# Patient Record
Sex: FEMALE | Race: OTHER | HISPANIC OR LATINO | URBAN - METROPOLITAN AREA
[De-identification: names, ages, dates, MRNs, and addresses within clinical notes are randomized per-mention and may not be internally consistent; named-entity substitution may affect disease eponyms.]

---

## 2024-07-13 ENCOUNTER — EMERGENCY (EMERGENCY)
Facility: HOSPITAL | Age: 69
LOS: 0 days | Discharge: HOME | End: 2024-07-13
Attending: EMERGENCY MEDICINE
Payer: SELF-PAY

## 2024-07-13 VITALS
HEART RATE: 58 BPM | RESPIRATION RATE: 17 BRPM | DIASTOLIC BLOOD PRESSURE: 83 MMHG | SYSTOLIC BLOOD PRESSURE: 118 MMHG | OXYGEN SATURATION: 100 %

## 2024-07-13 VITALS — HEIGHT: 66 IN | WEIGHT: 178.79 LBS

## 2024-07-13 DIAGNOSIS — Z88.0 ALLERGY STATUS TO PENICILLIN: ICD-10-CM

## 2024-07-13 DIAGNOSIS — M25.552 PAIN IN LEFT HIP: ICD-10-CM

## 2024-07-13 DIAGNOSIS — M54.9 DORSALGIA, UNSPECIFIED: ICD-10-CM

## 2024-07-13 PROCEDURE — 96372 THER/PROPH/DIAG INJ SC/IM: CPT

## 2024-07-13 PROCEDURE — 99283 EMERGENCY DEPT VISIT LOW MDM: CPT | Mod: 25

## 2024-07-13 PROCEDURE — 99284 EMERGENCY DEPT VISIT MOD MDM: CPT

## 2024-07-13 PROCEDURE — 73503 X-RAY EXAM HIP UNI 4/> VIEWS: CPT | Mod: LT

## 2024-07-13 PROCEDURE — 73503 X-RAY EXAM HIP UNI 4/> VIEWS: CPT | Mod: 26,LT

## 2024-07-13 RX ORDER — CYCLOBENZAPRINE HYDROCHLORIDE 15 MG/1
10 CAPSULE, EXTENDED RELEASE ORAL ONCE
Refills: 0 | Status: COMPLETED | OUTPATIENT
Start: 2024-07-13 | End: 2024-07-13

## 2024-07-13 RX ORDER — ACETAMINOPHEN 500 MG/5ML
1000 LIQUID (ML) ORAL ONCE
Refills: 0 | Status: COMPLETED | OUTPATIENT
Start: 2024-07-13 | End: 2024-07-13

## 2024-07-13 RX ORDER — CYCLOBENZAPRINE HYDROCHLORIDE 15 MG/1
1 CAPSULE, EXTENDED RELEASE ORAL
Qty: 15 | Refills: 0
Start: 2024-07-13 | End: 2024-07-17

## 2024-07-13 RX ORDER — KETOROLAC TROMETHAMINE 30 MG/ML
30 INJECTION, SOLUTION INTRAMUSCULAR; INTRAVENOUS ONCE
Refills: 0 | Status: DISCONTINUED | OUTPATIENT
Start: 2024-07-13 | End: 2024-07-13

## 2024-07-13 RX ORDER — LIDOCAINE HYDROCHLORIDE 20 MG/ML
1 JELLY TOPICAL ONCE
Refills: 0 | Status: COMPLETED | OUTPATIENT
Start: 2024-07-13 | End: 2024-07-13

## 2024-07-13 RX ORDER — LIDOCAINE HYDROCHLORIDE 20 MG/ML
1 JELLY TOPICAL
Qty: 2 | Refills: 0
Start: 2024-07-13 | End: 2024-07-22

## 2024-07-13 RX ADMIN — Medication 1000 MILLIGRAM(S): at 13:39

## 2024-07-13 RX ADMIN — CYCLOBENZAPRINE HYDROCHLORIDE 10 MILLIGRAM(S): 15 CAPSULE, EXTENDED RELEASE ORAL at 13:39

## 2024-07-13 RX ADMIN — KETOROLAC TROMETHAMINE 30 MILLIGRAM(S): 30 INJECTION, SOLUTION INTRAMUSCULAR; INTRAVENOUS at 13:40

## 2024-07-13 RX ADMIN — LIDOCAINE HYDROCHLORIDE 1 PATCH: 20 JELLY TOPICAL at 13:39

## 2024-08-22 DIAGNOSIS — M25.559 PAIN IN UNSPECIFIED HIP: ICD-10-CM

## 2024-08-22 PROBLEM — Z00.00 ENCOUNTER FOR PREVENTIVE HEALTH EXAMINATION: Status: ACTIVE | Noted: 2024-08-22

## 2024-08-26 ENCOUNTER — APPOINTMENT (OUTPATIENT)
Dept: ORTHOPEDIC SURGERY | Facility: CLINIC | Age: 69
End: 2024-08-26
Payer: MEDICAID

## 2024-08-26 ENCOUNTER — NON-APPOINTMENT (OUTPATIENT)
Age: 69
End: 2024-08-26

## 2024-08-26 DIAGNOSIS — Z96.649 PRESENCE OF UNSPECIFIED ARTIFICIAL HIP JOINT: ICD-10-CM

## 2024-08-26 DIAGNOSIS — M25.552 PAIN IN LEFT HIP: ICD-10-CM

## 2024-08-26 PROCEDURE — 99204 OFFICE O/P NEW MOD 45 MIN: CPT

## 2024-08-26 NOTE — DISCUSSION/SUMMARY
[de-identified] : Left hip pain, history of cemented monoblock Ernesto hip arthroplasty in [place], Possible lumbar radiculopathy  Extensive discussion of the natural history of this issue was had with the patient.  We discussed the treatment options focusing on conservative therapy which includes anti-inflammatories, physical therapy/home exercise, & activity modification.   To ensure there is no infection I would like ESR and CRP blood work. An MRI of the left hip was ordered to evaluate for sources of pain and to see the extent of the cartilage We did discuss possible conversion total hip arthroplasty surgery.  I explained that this would require removal of the entire implant and cement and she would be at high risk of fracture and other complications.  Discussed this is a major surgery that would not shorten her leg length and may even lengthen it.  The goal would be for pain control however this is not guaranteed either. Patient will follow-up after the MRI and blood work are complete.  The patient's current medication management of their orthopedic diagnosis was reviewed today: (1) We discussed a comprehensive treatment plan that included possible pharmaceutical management involving the use of prescription strength medications including but not limited to options such as oral Ibuprofen 400mg QID, once daily Meloxicam 15 mg, or 500-650 mg Tylenol versus over the counter oral medications and topical prescription NSAID Pennsaid vs over the counter Voltaren gel. (2) There is a moderate risk of morbidity with further treatment, especially from use of prescription strength medications and possible side effects of these medications which include upset stomach with oral medications, skin reactions to topical medications and cardiac/renal issues with long term use. (3) I recommended that the patient follow-up with their medical physician to discuss any significant specific potential issues with long term medication use such as interactions with current medications or with exacerbation of underlying medical comorbidities. (4) The benefits and risks associated with use of injectable, oral or topical, prescription and over the counter anti-inflammatory medications were discussed with the patient. The patient voiced understanding of the risks including but not limited to bleeding, stroke, kidney dysfunction, heart disease, and were referred to the black box warning label for further information.

## 2024-08-26 NOTE — PHYSICAL EXAM
[de-identified] : Left hip Significant pain with deep flexion internal rotation Mildly tender to palpation and buttock Minimal pain with resisted abduction, adduction, hip flexion, knee flexion, good strength Negative straight leg raise Left leg is clinically 1 to 2 cm longer than right [de-identified] : Prior imaging reviewed-cemented monoblock left Ernesto hip arthroplasty, roughly 12 mm longer than right hip

## 2024-08-26 NOTE — HISTORY OF PRESENT ILLNESS
[de-identified] : 8/16/24: Patient presents with left hip pain.  History through .  Patient states she fell in [place] 2 years ago and underwent partial hip replacement.  Never felt great however the pain has been worsening in the left groin since then.  Also pain in the left buttock.  She now feels like it is hard to walk more than 1 block due to this pain.  She also states her left leg feels longer than her right.  Does have some occasional radiating pain as well as some numbness in her left foot at times.  Does feel like her leg alex.  Taking ibuprofen and lidocaine patches for the pain.  Did try physical therapy in the past which provided no relief.  Feels this hip pain is limiting her life.  Allergies: Penicillin-rash Hx of DVT/PE: Denies AC: Denies Tobacco: Denies PMH: Denies

## 2024-08-28 ENCOUNTER — NON-APPOINTMENT (OUTPATIENT)
Age: 69
End: 2024-08-28

## 2024-08-28 LAB
CRP SERPL-MCNC: <3 MG/L
ERYTHROCYTE [SEDIMENTATION RATE] IN BLOOD BY WESTERGREN METHOD: 15 MM/HR

## 2024-08-31 ENCOUNTER — OUTPATIENT (OUTPATIENT)
Dept: OUTPATIENT SERVICES | Facility: HOSPITAL | Age: 69
LOS: 1 days | End: 2024-08-31
Payer: MEDICAID

## 2024-08-31 ENCOUNTER — APPOINTMENT (OUTPATIENT)
Dept: MRI IMAGING | Facility: CLINIC | Age: 69
End: 2024-08-31

## 2024-08-31 DIAGNOSIS — Z96.649 PRESENCE OF UNSPECIFIED ARTIFICIAL HIP JOINT: ICD-10-CM

## 2024-08-31 DIAGNOSIS — M25.552 PAIN IN LEFT HIP: ICD-10-CM

## 2024-08-31 PROCEDURE — 73721 MRI JNT OF LWR EXTRE W/O DYE: CPT | Mod: 26,LT

## 2024-08-31 PROCEDURE — 73721 MRI JNT OF LWR EXTRE W/O DYE: CPT

## 2024-09-06 ENCOUNTER — OFFICE (OUTPATIENT)
Dept: URBAN - METROPOLITAN AREA CLINIC 70 | Facility: CLINIC | Age: 69
Setting detail: OPHTHALMOLOGY
End: 2024-09-06
Payer: MEDICAID

## 2024-09-06 DIAGNOSIS — H35.3131: ICD-10-CM

## 2024-09-06 DIAGNOSIS — H02.834: ICD-10-CM

## 2024-09-06 DIAGNOSIS — H25.13: ICD-10-CM

## 2024-09-06 DIAGNOSIS — H02.831: ICD-10-CM

## 2024-09-06 DIAGNOSIS — H52.4: ICD-10-CM

## 2024-09-06 PROCEDURE — 92004 COMPRE OPH EXAM NEW PT 1/>: CPT | Performed by: STUDENT IN AN ORGANIZED HEALTH CARE EDUCATION/TRAINING PROGRAM

## 2024-09-06 PROCEDURE — 92250 FUNDUS PHOTOGRAPHY W/I&R: CPT | Performed by: STUDENT IN AN ORGANIZED HEALTH CARE EDUCATION/TRAINING PROGRAM

## 2024-09-06 PROCEDURE — 92015 DETERMINE REFRACTIVE STATE: CPT | Performed by: STUDENT IN AN ORGANIZED HEALTH CARE EDUCATION/TRAINING PROGRAM

## 2024-09-06 ASSESSMENT — CONFRONTATIONAL VISUAL FIELD TEST (CVF)
OS_FINDINGS: FULL
OD_FINDINGS: FULL

## 2024-09-06 ASSESSMENT — LID POSITION - DERMATOCHALASIS
OS_DERMATOCHALASIS: 2+
OD_DERMATOCHALASIS: 2+

## 2024-09-13 ENCOUNTER — APPOINTMENT (OUTPATIENT)
Dept: ORTHOPEDIC SURGERY | Facility: CLINIC | Age: 69
End: 2024-09-13
Payer: MEDICAID

## 2024-09-13 ENCOUNTER — NON-APPOINTMENT (OUTPATIENT)
Age: 69
End: 2024-09-13

## 2024-09-13 DIAGNOSIS — M25.552 PAIN IN LEFT HIP: ICD-10-CM

## 2024-09-13 DIAGNOSIS — Z96.649 PRESENCE OF UNSPECIFIED ARTIFICIAL HIP JOINT: ICD-10-CM

## 2024-09-13 PROCEDURE — 99215 OFFICE O/P EST HI 40 MIN: CPT

## 2024-09-13 NOTE — DISCUSSION/SUMMARY
[de-identified] : Left hip arthritis status post Ernesto hip arthroplasty in [place] with limitations of activities of daily living and conservative treatment options failing to improve disability.  Plan: Left Hip conversion to total hip arthroplasty with removal of hardware  The patient has arthritis/end stage joint disease of the hip supported by x-ray or MRI that demonstrated one of the following:  periarticular osteophytes; joint space narrowing; avascular necrosis; subchondral cysts; subchondral sclerosis; or bone on bone articulation; or acetabular fracture; or femoral neck fracture; or nonunion, malunion, or failure of previous hip fracture surgery; or malignancy of the pelvis, proximal femur, or soft tissues of the hip.  One or more of the following conservative treatments have been tried and failed for 3 months or more: analgesic medication; home exercises; anti-inflammatory medication; physical therapy;   use of walker or cane;  We discussed the natural history of hip arthritis and the potential treatment options. The importance of diet and exercise was discussed as excess weight is a significant contributing factor. Due to the pain, failure of prior nonoperative treatment and associated disability, the patient is indicated for a total hip replacement.   A risk/benefit analysis was discussed with the patient reviewing the advantages and disadvantages of surgical intervention at this time. Both the level and length of the patient's pain have made additional conservative treatment measures contraindicated. A full explanation was given of the nature and the purpose of the procedure and anesthesia, its benefits, possible alternative methods of treatment, the risks involved, the possibility of complications, the foreseeable consequences of the procedure and the possible results of the non-treatment. I reviewed the plan of care as well as a model of a total hip implant equivalent to the one that will be used for their total hip replacement. The patient agrees with the plan of care as well as the use of implants for their replacement. We also discussed that if robotic/computer navigation is utilized, then additional incisions may need to be made to accommodate the computer navigation arrays.  The potential biologic and mechanical risks of the procedure were discussed. This discussion included but was not limited to thromboembolic disease, fracture, loss of fixation, infection, leg length discrepancy, dislocation and neurovascular injury. The patient is also understands that anesthesia and their comorbidities present additional risks. Proper expectations were addressed as this surgery may only partially or even fail to relieve their pain. The patient understands that additional surgery may be needed during the perioperative period or in the future. We discussed the durability of prosthetic hips and limitations related to wear, osteolysis and loosening. All questions were answered the patient's satisfaction. The patient was given my packet of additional information about the procedure.  Expect 1-2 day stay in hospital as this is less than standard across the country.  Although outpatient surgery may be feasible in carefully selected heathy patients - the definition of a "healthy" patient to do this in has not been properly studied in randomized trials.  This hospital time is important to observe for urinary retention, neurologic decline, cardiopulmonary issues, and signs of blood clot which are frequent early complications of joint replacements.  This time will also be used to work with PT so they are safe to navigate without falling which could lead to fracture and more surgery.   There is no evidence for any of the following contraindications for total joint replacement surgery:  active infection; active systemic bacteremia; active skin infection or open wound at surgical site; neuropathic arthritis; severe, rapidly progressive neurologic disease; severe medical conditions that makes the risks of surgery outweigh the potential benefit.  The hip/groin pain is affecting the ability to perform activities of daily living despite activity modifications.  The painful hip exam and the radiographic findings of cartilage loss are consistent with the hip OA as the source of the disability and pain. Patient has failed conservative treatment.  I explained that this would require removal of the entire implant and cement and she would be at high risk of fracture and other complications.  Discussed this is a major surgery that would not shorten her leg length and may even lengthen it.  The goal would be for pain control however this is not guaranteed either.  I emphasized the extremely high risk nature of the surgery, patient voiced understanding but feels she is unable to live as she is currently.  We discussed high risk of fractures, neurovascular injury due to prior scar tissue and the fact she was ready significantly lengthened.  I emphasized that she will likely continue to need a shoe lift on the other side.  Surgery will be scheduled at a convenient time.   Preop medical clearance.  CT scan for preop planning with Johnny Postop DVT ppx: Per Caprini Score Abx ppx: Ancef with extended Duricef to high risk infection in the revision setting Dressing: Negative pressure dressing to the elevated risk of infection in the revision setting.

## 2024-09-13 NOTE — PHYSICAL EXAM
[de-identified] : Left hip Significant pain with deep flexion internal rotation Mildly tender to palpation and buttock Minimal pain with resisted abduction, adduction, hip flexion, knee flexion, good strength Negative straight leg raise Left leg is clinically 1 to 2 cm longer than right [de-identified] : 9/13/24: MRI left hip 8/31/2024-no gross loosening, mild stress shielding proximal femur Prior imaging reviewed-cemented monoblock left Ernesto hip arthroplasty, roughly 12 mm longer than right hip

## 2024-09-13 NOTE — HISTORY OF PRESENT ILLNESS
[de-identified] : 9/13/24: Patient here for follow-up left hip pain.  History through nephew as per preference.  Patient states the pain is significantly limiting her life she would like to proceed with operative intervention.  8/16/24: Patient presents with left hip pain.  History through .  Patient states she fell in [place] 2 years ago and underwent partial hip replacement.  Never felt great however the pain has been worsening in the left groin since then.  Also pain in the left buttock.  She now feels like it is hard to walk more than 1 block due to this pain.  She also states her left leg feels longer than her right.  Does have some occasional radiating pain as well as some numbness in her left foot at times.  Does feel like her leg alex.  Taking ibuprofen and lidocaine patches for the pain.  Did try physical therapy in the past which provided no relief.  Feels this hip pain is limiting her life.  Allergies: Penicillin-rash Hx of DVT/PE: Denies AC: Denies Tobacco: Denies PMH: Denies

## 2024-09-14 ENCOUNTER — NON-APPOINTMENT (OUTPATIENT)
Age: 69
End: 2024-09-14

## 2024-09-18 ENCOUNTER — NON-APPOINTMENT (OUTPATIENT)
Age: 69
End: 2024-09-18

## 2024-09-23 ENCOUNTER — APPOINTMENT (OUTPATIENT)
Dept: CARDIOLOGY | Facility: CLINIC | Age: 69
End: 2024-09-23
Payer: MEDICAID

## 2024-09-23 ENCOUNTER — NON-APPOINTMENT (OUTPATIENT)
Age: 69
End: 2024-09-23

## 2024-09-23 VITALS
HEIGHT: 62 IN | HEART RATE: 75 BPM | SYSTOLIC BLOOD PRESSURE: 139 MMHG | TEMPERATURE: 98 F | OXYGEN SATURATION: 97 % | DIASTOLIC BLOOD PRESSURE: 73 MMHG | WEIGHT: 142 LBS | RESPIRATION RATE: 16 BRPM | BODY MASS INDEX: 26.13 KG/M2

## 2024-09-23 VITALS
DIASTOLIC BLOOD PRESSURE: 82 MMHG | SYSTOLIC BLOOD PRESSURE: 122 MMHG | HEART RATE: 59 BPM | BODY MASS INDEX: 33.31 KG/M2 | OXYGEN SATURATION: 95 % | WEIGHT: 181 LBS | TEMPERATURE: 98 F | HEIGHT: 62 IN

## 2024-09-23 DIAGNOSIS — R00.2 PALPITATIONS: ICD-10-CM

## 2024-09-23 DIAGNOSIS — Z78.9 OTHER SPECIFIED HEALTH STATUS: ICD-10-CM

## 2024-09-23 DIAGNOSIS — M25.559 PAIN IN UNSPECIFIED HIP: ICD-10-CM

## 2024-09-23 DIAGNOSIS — Z96.649 PRESENCE OF UNSPECIFIED ARTIFICIAL HIP JOINT: ICD-10-CM

## 2024-09-23 PROCEDURE — 99204 OFFICE O/P NEW MOD 45 MIN: CPT

## 2024-09-23 PROCEDURE — 93000 ELECTROCARDIOGRAM COMPLETE: CPT

## 2024-09-23 NOTE — PHYSICAL EXAM
[General Appearance - Well Developed] : well developed [Normal Appearance] : normal appearance [Well Groomed] : well groomed [General Appearance - Well Nourished] : well nourished [No Deformities] : no deformities [General Appearance - In No Acute Distress] : no acute distress [Normal Conjunctiva] : the conjunctiva exhibited no abnormalities [Eyelids - No Xanthelasma] : the eyelids demonstrated no xanthelasmas [Normal Oral Mucosa] : normal oral mucosa [No Oral Pallor] : no oral pallor [No Oral Cyanosis] : no oral cyanosis [Normal Jugular Venous A Waves Present] : normal jugular venous A waves present [Normal Jugular Venous V Waves Present] : normal jugular venous V waves present [No Jugular Venous Ospina A Waves] : no jugular venous ospina A waves [Respiration, Rhythm And Depth] : normal respiratory rhythm and effort [Exaggerated Use Of Accessory Muscles For Inspiration] : no accessory muscle use [Auscultation Breath Sounds / Voice Sounds] : lungs were clear to auscultation bilaterally [Heart Rate And Rhythm] : heart rate and rhythm were normal [Heart Sounds] : normal S1 and S2 [Murmurs] : no murmurs present [Abdomen Soft] : soft [Abdomen Tenderness] : non-tender [Abdomen Mass (___ Cm)] : no abdominal mass palpated [Abnormal Walk] : normal gait [Gait - Sufficient For Exercise Testing] : the gait was sufficient for exercise testing [Nail Clubbing] : no clubbing of the fingernails [Cyanosis, Localized] : no localized cyanosis [Petechial Hemorrhages (___cm)] : no petechial hemorrhages [Skin Color & Pigmentation] : normal skin color and pigmentation [] : no rash [No Venous Stasis] : no venous stasis [Skin Lesions] : no skin lesions [No Skin Ulcers] : no skin ulcer [No Xanthoma] : no  xanthoma was observed [Oriented To Time, Place, And Person] : oriented to person, place, and time [Affect] : the affect was normal [Mood] : the mood was normal [No Anxiety] : not feeling anxious

## 2024-09-23 NOTE — ASSESSMENT
[FreeTextEntry1] : Patient will have free of blood work done in the hospital.  EKG done in the office regular sinus rhythm poor R wave progression.  Needs evaluation after blood work.

## 2024-09-23 NOTE — HISTORY OF PRESENT ILLNESS
[Scheduled Procedure ___] : a [unfilled] [Date of Surgery ___] : on [unfilled] [FreeTextEntry1] : 68 years old female comes to the office f to establish as new patient for medical care. patient had left hip arthroplasty due to fracture about 2 years ago ...  Patient continues to have severe pain in the left hip since and patient needs revision of the left hip years.  No history of hypertension no history of diabetes mellitus-patient is not on medications.  Patient claims she is in good health

## 2024-09-29 ENCOUNTER — EMERGENCY (EMERGENCY)
Facility: HOSPITAL | Age: 69
LOS: 0 days | Discharge: HOME | End: 2024-09-29
Attending: STUDENT IN AN ORGANIZED HEALTH CARE EDUCATION/TRAINING PROGRAM
Payer: MEDICAID

## 2024-09-29 VITALS
OXYGEN SATURATION: 96 % | HEART RATE: 64 BPM | DIASTOLIC BLOOD PRESSURE: 81 MMHG | SYSTOLIC BLOOD PRESSURE: 129 MMHG | TEMPERATURE: 98 F | RESPIRATION RATE: 16 BRPM | WEIGHT: 180.78 LBS

## 2024-09-29 DIAGNOSIS — H66.91 OTITIS MEDIA, UNSPECIFIED, RIGHT EAR: ICD-10-CM

## 2024-09-29 DIAGNOSIS — H92.01 OTALGIA, RIGHT EAR: ICD-10-CM

## 2024-09-29 DIAGNOSIS — G44.89 OTHER HEADACHE SYNDROME: ICD-10-CM

## 2024-09-29 DIAGNOSIS — R51.9 HEADACHE, UNSPECIFIED: ICD-10-CM

## 2024-09-29 LAB
ALBUMIN SERPL ELPH-MCNC: 3.7 G/DL — SIGNIFICANT CHANGE UP (ref 3.3–5)
ALP SERPL-CCNC: 96 U/L — SIGNIFICANT CHANGE UP (ref 40–120)
ALT FLD-CCNC: 17 U/L — SIGNIFICANT CHANGE UP (ref 12–78)
ANION GAP SERPL CALC-SCNC: 6 MMOL/L — SIGNIFICANT CHANGE UP (ref 5–17)
AST SERPL-CCNC: 17 U/L — SIGNIFICANT CHANGE UP (ref 15–37)
BASOPHILS # BLD AUTO: 0.04 K/UL — SIGNIFICANT CHANGE UP (ref 0–0.2)
BASOPHILS NFR BLD AUTO: 0.6 % — SIGNIFICANT CHANGE UP (ref 0–2)
BILIRUB SERPL-MCNC: 0.5 MG/DL — SIGNIFICANT CHANGE UP (ref 0.2–1.2)
BUN SERPL-MCNC: 19 MG/DL — SIGNIFICANT CHANGE UP (ref 7–23)
CALCIUM SERPL-MCNC: 9 MG/DL — SIGNIFICANT CHANGE UP (ref 8.5–10.1)
CHLORIDE SERPL-SCNC: 106 MMOL/L — SIGNIFICANT CHANGE UP (ref 96–108)
CO2 SERPL-SCNC: 27 MMOL/L — SIGNIFICANT CHANGE UP (ref 22–31)
CREAT SERPL-MCNC: 0.82 MG/DL — SIGNIFICANT CHANGE UP (ref 0.5–1.3)
EGFR: 77 ML/MIN/1.73M2 — SIGNIFICANT CHANGE UP
EOSINOPHIL # BLD AUTO: 0.08 K/UL — SIGNIFICANT CHANGE UP (ref 0–0.5)
EOSINOPHIL NFR BLD AUTO: 1.3 % — SIGNIFICANT CHANGE UP (ref 0–6)
GLUCOSE SERPL-MCNC: 102 MG/DL — HIGH (ref 70–99)
HCT VFR BLD CALC: 45.7 % — HIGH (ref 34.5–45)
HGB BLD-MCNC: 15.1 G/DL — SIGNIFICANT CHANGE UP (ref 11.5–15.5)
IMM GRANULOCYTES NFR BLD AUTO: 0.3 % — SIGNIFICANT CHANGE UP (ref 0–0.9)
LYMPHOCYTES # BLD AUTO: 1.71 K/UL — SIGNIFICANT CHANGE UP (ref 1–3.3)
LYMPHOCYTES # BLD AUTO: 27.2 % — SIGNIFICANT CHANGE UP (ref 13–44)
MCHC RBC-ENTMCNC: 30.9 PG — SIGNIFICANT CHANGE UP (ref 27–34)
MCHC RBC-ENTMCNC: 33 GM/DL — SIGNIFICANT CHANGE UP (ref 32–36)
MCV RBC AUTO: 93.6 FL — SIGNIFICANT CHANGE UP (ref 80–100)
MONOCYTES # BLD AUTO: 0.36 K/UL — SIGNIFICANT CHANGE UP (ref 0–0.9)
MONOCYTES NFR BLD AUTO: 5.7 % — SIGNIFICANT CHANGE UP (ref 2–14)
NEUTROPHILS # BLD AUTO: 4.07 K/UL — SIGNIFICANT CHANGE UP (ref 1.8–7.4)
NEUTROPHILS NFR BLD AUTO: 64.9 % — SIGNIFICANT CHANGE UP (ref 43–77)
PLATELET # BLD AUTO: 300 K/UL — SIGNIFICANT CHANGE UP (ref 150–400)
POTASSIUM SERPL-MCNC: 4.5 MMOL/L — SIGNIFICANT CHANGE UP (ref 3.5–5.3)
POTASSIUM SERPL-SCNC: 4.5 MMOL/L — SIGNIFICANT CHANGE UP (ref 3.5–5.3)
PROT SERPL-MCNC: 8 GM/DL — SIGNIFICANT CHANGE UP (ref 6–8.3)
RBC # BLD: 4.88 M/UL — SIGNIFICANT CHANGE UP (ref 3.8–5.2)
RBC # FLD: 12.5 % — SIGNIFICANT CHANGE UP (ref 10.3–14.5)
SODIUM SERPL-SCNC: 139 MMOL/L — SIGNIFICANT CHANGE UP (ref 135–145)
WBC # BLD: 6.28 K/UL — SIGNIFICANT CHANGE UP (ref 3.8–10.5)
WBC # FLD AUTO: 6.28 K/UL — SIGNIFICANT CHANGE UP (ref 3.8–10.5)

## 2024-09-29 PROCEDURE — 96375 TX/PRO/DX INJ NEW DRUG ADDON: CPT

## 2024-09-29 PROCEDURE — 85025 COMPLETE CBC W/AUTO DIFF WBC: CPT

## 2024-09-29 PROCEDURE — 36415 COLL VENOUS BLD VENIPUNCTURE: CPT

## 2024-09-29 PROCEDURE — 80053 COMPREHEN METABOLIC PANEL: CPT

## 2024-09-29 PROCEDURE — 99284 EMERGENCY DEPT VISIT MOD MDM: CPT | Mod: 25

## 2024-09-29 PROCEDURE — 96374 THER/PROPH/DIAG INJ IV PUSH: CPT

## 2024-09-29 PROCEDURE — 70450 CT HEAD/BRAIN W/O DYE: CPT | Mod: 26,MC

## 2024-09-29 PROCEDURE — 99284 EMERGENCY DEPT VISIT MOD MDM: CPT

## 2024-09-29 PROCEDURE — 70450 CT HEAD/BRAIN W/O DYE: CPT | Mod: MC

## 2024-09-29 RX ORDER — ONDANSETRON 2 MG/ML
1 INJECTION, SOLUTION INTRAMUSCULAR; INTRAVENOUS
Qty: 12 | Refills: 0
Start: 2024-09-29 | End: 2024-10-02

## 2024-09-29 RX ORDER — SODIUM CHLORIDE 9 MG/ML
1000 INJECTION INTRAMUSCULAR; INTRAVENOUS; SUBCUTANEOUS ONCE
Refills: 0 | Status: COMPLETED | OUTPATIENT
Start: 2024-09-29 | End: 2024-09-29

## 2024-09-29 RX ORDER — ACETAMINOPHEN 325 MG/1
1000 TABLET ORAL ONCE
Refills: 0 | Status: COMPLETED | OUTPATIENT
Start: 2024-09-29 | End: 2024-09-29

## 2024-09-29 RX ORDER — METOCLOPRAMIDE HCL 5 MG
10 TABLET ORAL ONCE
Refills: 0 | Status: COMPLETED | OUTPATIENT
Start: 2024-09-29 | End: 2024-09-29

## 2024-09-29 RX ORDER — CEFPODOXIME PROXETIL 100 MG/5ML
200 GRANULE, FOR SUSPENSION ORAL ONCE
Refills: 0 | Status: COMPLETED | OUTPATIENT
Start: 2024-09-29 | End: 2024-09-29

## 2024-09-29 RX ORDER — CEFPODOXIME PROXETIL 100 MG/5ML
1 GRANULE, FOR SUSPENSION ORAL
Qty: 20 | Refills: 0
Start: 2024-09-29 | End: 2024-10-08

## 2024-09-29 RX ORDER — DIPHENHYDRAMINE HCL 50 MG
25 CAPSULE ORAL ONCE
Refills: 0 | Status: COMPLETED | OUTPATIENT
Start: 2024-09-29 | End: 2024-09-29

## 2024-09-29 RX ADMIN — Medication 25 MILLIGRAM(S): at 13:27

## 2024-09-29 RX ADMIN — CEFPODOXIME PROXETIL 200 MILLIGRAM(S): 100 GRANULE, FOR SUSPENSION ORAL at 15:28

## 2024-09-29 RX ADMIN — Medication 10 MILLIGRAM(S): at 13:31

## 2024-09-29 RX ADMIN — ACETAMINOPHEN 400 MILLIGRAM(S): 325 TABLET ORAL at 13:27

## 2024-09-29 RX ADMIN — SODIUM CHLORIDE 1000 MILLILITER(S): 9 INJECTION INTRAMUSCULAR; INTRAVENOUS; SUBCUTANEOUS at 13:31

## 2024-09-29 NOTE — ED STATDOCS - PATIENT PORTAL LINK FT
You can access the FollowMyHealth Patient Portal offered by [facility] by registering at the following website: [email or web address]. By joining [employer]’s FollowMyHealth portal, you will also be able to view your health information using other applications (apps) compatible with our system.

## 2024-09-29 NOTE — ED STATDOCS - CLINICAL SUMMARY MEDICAL DECISION MAKING FREE TEXT BOX
CT-scan showed fluid around mastoid, and will treat with antibiotics and ENT follow up. Headache improved with medications, Presentation was concerning for right acute otitis media.  In the setting of patient additionally having headache will provide pain control perform CT scan basic blood work monitor and reassessment.CT-scan showed fluid around mastoid, and will treat with antibiotics and dc home ENT follow up. Headache improved with medications,  patient is neurologically intact and afebrile. Given strict return precautions and DC home in stable condition  With oral antibiotics

## 2024-09-29 NOTE — ED STATDOCS - PHYSICAL EXAMINATION
Constitutional: well appearing, NAD AAOx3  Eyes: EOMI, PERRL  Head: Normocephalic atraumatic  Mouth: no airway obstruction, posterior oropharynx clear without erythema or exudate  Ears: bulging TM on the right side, no erythema, no effusion  Neck: supple  Cardiac: regular rate and rhythm, no MRG  Resp: Lungs CTAB  GI: Abd s/nt/nd  Neuro: CN2-12 intact, strength 5/5x4, sensation grossly intact  Skin: No rashes Constitutional: well appearing, NAD AAOx3  Eyes: EOMI, PERRL  Head: Normocephalic atraumatic  Mouth: no airway obstruction, posterior oropharynx clear without erythema or exudate  Ears: bulging TM on the right side, no erythema, no effusion. L TM grey without bulging, external auditory canals clear b/l. No tenderness over mastoid b/l  Neck: supple  Cardiac: regular rate and rhythm, no MRG  Resp: Lungs CTAB  GI: Abd s/nt/nd  Neuro: CN2-12 intact, strength 5/5x4, sensation grossly intact  Skin: No rashes

## 2024-09-29 NOTE — ED STATDOCS - PROGRESS NOTE DETAILS
67 y/o female with a PMHx of HA  presents to the ED c/o right ear pain and HA. Recently evaluated at urgent care, given migraine meds and discharged. Negative visual changes, nausea, vomiting, dizziness, and has never had imaging of her head in the past. Pt is alllergic to penicillin. P Pt. is a 68 year old female presents with HA and right ear pain.  Pt. evaluated at urgent care and DC with Excedron.  NEg. visual chages, vomting or dizziness.  Pt. has never been evluated for her headaches in the past.  Neg. dizziness.  Janel Hernández PA-C

## 2024-09-29 NOTE — ED STATDOCS - CARE PROVIDER_API CALL
Shahid Lizarraga  Neurology  [address], [address]  [place] [address]  Phone: [phone]  Fax: [phone]  Follow Up Time:     Presley Das  Otolaryngology  [address], [address]  [place] [address]  Phone: [phone]  Fax: [phone]  Follow Up Time:     Kirk He  Otolaryngology  [address] [place] [address]  Phone: [phone]  Fax: [phone]  Follow Up Time:

## 2024-09-29 NOTE — ED ADULT NURSE NOTE - OBJECTIVE STATEMENT
pt developed pain R. ear 1 week ago and headache. pt went to urgent care and told to take excedrin for migraines. pt had relief for 3 days, but returned on Thurday. last night pt had worsening headache and ear pain as well as nausea. pt vomited 2x yesterday.

## 2024-09-29 NOTE — ED STATDOCS - PROVIDER TOKENS
PROVIDER:[TOKEN:[[id number]]],PROVIDER:[TOKEN:[[id number]]],PROVIDER:[TOKEN:[[id number]]]

## 2024-09-29 NOTE — ED ADULT TRIAGE NOTE - CHIEF COMPLAINT QUOTE
pt presenting with sharp left ear pain and N/V. Pt states she has migraines, and went to UC a few days ago, they didn't give her ABX and said it was her migraine. Denies other symptoms

## 2024-09-29 NOTE — ED STATDOCS - OBJECTIVE STATEMENT
69 y/o female with a PMHx of HA  presents to the ED c/o right ear pain and HA. Recently evaluated at urgent care, given migraine meds and discharged. Negative visual changes, nausea, vomiting, dizziness, and has never had imaging of her head in the past. Pt is alllergic to penicillin. 69 y/o female with a PMHx of HA  presents to the ED c/o right ear pain and HA x2d. Recently evaluated at urgent care, given migraine meds and discharged. Negative visual changes, nausea, vomiting, dizziness, numbness tingling weakness, fever, chills, cp, sob and has never had imaging of her head in the past. Pt is alllergic to penicillin.

## 2024-09-29 NOTE — ED STATDOCS - NSFOLLOWUPINSTRUCTIONS_ED_ALL_ED_FT
Otitis Media, Adult  An ear, with close-ups of a normal ear and an ear filled with fluid.  Otitis media occurs when there is inflammation and fluid in the middle ear with signs and symptoms of an acute infection. The middle ear is a part of the ear that contains bones for hearing as well as air that helps send sounds to the brain. When infected fluid builds up in this space, it causes pressure and can lead to an ear infection. The eustachian tube connects the middle ear to the back of the nose (nasopharynx) and normally allows air into the middle ear. If the eustachian tube becomes blocked, fluid can build up and become infected.    What are the causes?  This condition is caused by a blockage in the eustachian tube. This can be caused by mucus or by swelling of the tube. Problems that can cause a blockage include:  A cold or other upper respiratory infection.  Allergies.  An irritant, such as tobacco smoke.  Enlarged adenoids. The adenoids are areas of soft tissue located high in the back of the throat, behind the nose and the roof of the mouth. They are part of the body's defense system (immune system).  A mass in the nasopharynx.  Damage to the ear caused by pressure changes (barotrauma).  What increases the risk?  You are more likely to develop this condition if you:  Smoke or are exposed to tobacco smoke.  Have an opening in the roof of your mouth (cleft palate).  Have gastroesophageal reflux.  Have an immune system disorder.  What are the signs or symptoms?  Symptoms of this condition include:  Ear pain.  Fever.  Decreased hearing.  Tiredness (lethargy).  Fluid leaking from the ear, if the eardrum is ruptured or has burst.  Ringing in the ear.  How is this diagnosed?  A health care provider checks a person's ear using an otoscope. A close-up of the ear and otoscope is also shown.  This condition is diagnosed with a physical exam. During the exam, your health care provider will use an instrument called an otoscope to look in your ear and check for redness, swelling, and fluid. He or she will also ask about your symptoms.    Your health care provider may also order tests, such as:  A pneumatic otoscopy. This is a test to check the movement of the eardrum. It is done by squeezing a small amount of air into the ear.  A tympanogram. This is a test that shows how well the eardrum moves in response to air pressure in the ear canal. It provides a graph for your health care provider to review.  How is this treated?  This condition can go away on its own within 3–5 days. But if the condition is caused by a bacterial infection and does not go away on its own, or if it keeps coming back, your health care provider may:  Prescribe antibiotic medicine to treat the infection.  Prescribe or recommend medicines to control pain.  Follow these instructions at home:  Take over-the-counter and prescription medicines only as told by your health care provider.  If you were prescribed an antibiotic medicine, take it as told by your health care provider. Do not stop taking the antibiotic even if you start to feel better.  Keep all follow-up visits. This is important.  Contact a health care provider if:  You have bleeding from your nose.  There is a lump on your neck.  You are not feeling better in 5 days.  You feel worse instead of better.  Get help right away if:  You have severe pain that is not controlled with medicine.  You have swelling, redness, or pain around your ear.  You have stiffness in your neck.  A part of your face is not moving (paralyzed).  The bone behind your ear (mastoid bone) is tender when you touch it.  You develop a severe headache.  Summary  Otitis media is redness, soreness, and swelling of the middle ear, usually resulting in pain and decreased hearing.  This condition can go away on its own within 3–5 days.  If the problem does not go away in 3–5 days, your health care provider may give you medicines to treat the infection.  If you were prescribed an antibiotic medicine, take it as told by your health care provider.  Follow all instructions that were given to you by your health care provider.  This information is not intended to replace advice given to you by your health care provider. Make sure you discuss any questions you have with your health care provider.      Acute Headache    WHAT YOU NEED TO KNOW:    An acute headache is pain or discomfort that may start suddenly and get worse quickly. You may have an acute headache only when you feel stress or eat certain foods. Other acute headache pain can happen every day, and sometimes several times a day.  Headache Types    DISCHARGE INSTRUCTIONS:    Seek care immediately if:    You have severe pain.    You have numbness or weakness on one side of your face or body.    You have a headache that occurs after a blow to the head, a fall, or other trauma.    You have a headache, are forgetful or confused, or have trouble speaking.    You have a headache, stiff neck, and a fever.  Call your doctor if:    You have a constant headache and are vomiting.    You have a headache each day that does not get better, even after treatment.    You have changes in your headaches, or new symptoms that occur when you have a headache.    You have questions or concerns about your condition or care.  Medicines: You may need any of the following:    Prescription pain medicine may be given. The medicine your healthcare provider recommends will depend on the kind of headaches you have. You will need to take prescription headache medicines as directed to prevent a problem called rebound headache. These headaches happen with regular use of pain relievers for headache disorders.    NSAIDs, such as ibuprofen, help decrease swelling, pain, and fever. This medicine is available with or without a doctor's order. NSAIDs can cause stomach bleeding or kidney problems in certain people. If you take blood thinner medicine, always ask your healthcare provider if NSAIDs are safe for you. Always read the medicine label and follow directions.    Acetaminophen decreases pain and fever. It is available without a doctor's order. Ask how much to take and how often to take it. Follow directions. Read the labels of all other medicines you are using to see if they also contain acetaminophen, or ask your doctor or pharmacist. Acetaminophen can cause liver damage if not taken correctly.    Antidepressants may be given for some kinds of headaches.    Take your medicine as directed. Contact your healthcare provider if you think your medicine is not helping or if you have side effects. Tell your provider if you are allergic to any medicine. Keep a list of the medicines, vitamins, and herbs you take. Include the amounts, and when and why you take them. Bring the list or the pill bottles to follow-up visits. Carry your medicine list with you in case of an emergency.  Manage your symptoms:    Apply heat or ice on the headache area. Use a heat or ice pack. For an ice pack, you can also put crushed ice in a plastic bag. Cover the pack or bag with a towel before you apply it to your skin. Ice and heat both help decrease pain, and heat also helps decrease muscle spasms. Apply heat for 20 to 30 minutes every 2 hours. Apply ice for 15 to 20 minutes every hour. Apply heat or ice for as long and for as many days as directed. You may alternate heat and ice.    Relax your muscles. Lie down in a comfortable position and close your eyes. Relax your muscles slowly. Start at your toes and work your way up your body.    Keep a record of your headaches. Write down when your headaches start and stop. Include your symptoms and what you were doing when the headache began. Record what you ate or drank for 24 hours before the headache started. Describe the pain and where it hurts. Keep track of what you did to treat your headache and if it worked.  Prevent an acute headache:    Avoid anything that triggers an acute headache. Examples include exposure to chemicals, going to high altitude, or not getting enough sleep. Create a regular sleep routine. Go to sleep at the same time and wake up at the same time each day. Do not use electronic devices before bedtime. These may trigger a headache or prevent you from sleeping well.    Do not smoke. Nicotine and other chemicals in cigarettes and cigars can trigger an acute headache or make it worse. Ask your healthcare provider for information if you currently smoke and need help to quit. E-cigarettes or smokeless tobacco still contain nicotine. Talk to your healthcare provider before you use these products.    Limit alcohol as directed. Alcohol can trigger an acute headache or make it worse. If you have cluster headaches, do not drink alcohol during an episode. For other types of headaches, ask your healthcare provider if it is safe for you to drink alcohol. Ask how much is safe for you to drink, and how often.    Exercise as directed. Exercise can reduce tension and help with headache pain. Aim for 30 minutes of physical activity on most days of the week. Your healthcare provider can help you create an exercise plan.    Eat a variety of healthy foods. Healthy foods include fruits, vegetables, low-fat dairy products, lean meats, fish, whole grains, and cooked beans. Your healthcare provider or dietitian can help you create meals plans if you need to avoid foods that trigger headaches.  Follow up with your healthcare provider as directed: Bring your headache record with you when you see your healthcare provider. Write down your questions so you remember to ask them during your visits.

## 2024-09-29 NOTE — ED STATDOCS - CARE PROVIDERS DIRECT ADDRESSES
,[email or web address]

## 2024-10-01 ENCOUNTER — NON-APPOINTMENT (OUTPATIENT)
Age: 69
End: 2024-10-01

## 2024-10-01 ENCOUNTER — APPOINTMENT (OUTPATIENT)
Dept: NEUROLOGY | Facility: CLINIC | Age: 69
End: 2024-10-01
Payer: MEDICAID

## 2024-10-01 VITALS
TEMPERATURE: 98.2 F | DIASTOLIC BLOOD PRESSURE: 72 MMHG | WEIGHT: 180 LBS | HEART RATE: 62 BPM | SYSTOLIC BLOOD PRESSURE: 106 MMHG | BODY MASS INDEX: 33.13 KG/M2 | HEIGHT: 62 IN

## 2024-10-01 DIAGNOSIS — G43.909 MIGRAINE, UNSPECIFIED, NOT INTRACTABLE, W/OUT STATUS MIGRAINOSUS: ICD-10-CM

## 2024-10-01 PROCEDURE — 99204 OFFICE O/P NEW MOD 45 MIN: CPT

## 2024-10-01 RX ORDER — CEFPODOXIME PROXETIL 200 MG/1
200 TABLET, FILM COATED ORAL TWICE DAILY
Refills: 0 | Status: ACTIVE | COMMUNITY

## 2024-10-01 NOTE — DATA REVIEWED
[de-identified] : CT head no contrast 9/292/4: No acute intracranial hemorrhage, mass effect, or midline shift.

## 2024-10-01 NOTE — HISTORY OF PRESENT ILLNESS
[FreeTextEntry1] : Ms. Costa presents today for neurology evaluation. [place], [place] [id number], assisted with visit (Malaysian).  She reports having headaches which began over one week ago.  She says that she often gets headaches during this season. The pain is over the front, top and back of her head. She also has pain on the sides of her head and over her face. She describes the pain as pressure. The pain comes and goes throughout the day.  Pain is rated 8-9/10. She has associated nausea but denies vision changes, photophobia or phonophobia.  She says that she was previously diagnosed with migraines previously and this feels similar.  She does not have auras. The frequency is sporadic and random.   She was given Excedrin in urgent care but this caused GI side effects.  She was seen in the ED on 9/29/24. She was prescribed Cefpodoxime for otitis media.  She was prescribed Flunarazine in [place]. This is a selective calcium antagonist. She was taking this daily and then the dose was weaned.

## 2024-10-01 NOTE — PHYSICAL EXAM
[FreeTextEntry1] : Examination: Constitutional: normal, no apparent distress Eyes: normal conjunctiva b/l, no ptosis, visual fields full Respiratory: no respiratory distress, normal effort, normal auscultation Cardiovascular: normal rate, rhythm, no murmurs Neck: supple, no masses Vascular: carotids normal Skin: normal color, no rashes Psych: normal mood, affect  Neurological: Memory: normal memory, oriented to person, place, time Language intact/no aphasia Cranial Nerves: II-XII intact, Pupils equally round and reactive to light, ocular muscles/movements intact, no ptosis, no facial weakness, tongue protrudes normally in the midline,  Motor: normal tone, no pronator drift, full strength in all four extremities in the proximal and distal muscle groups Coordination: Fine motor movements intact, rapid alternating movements intact, finger to nose intact bilaterally Sensory: intact to light touch, vibration, joint position sense, negative Romberg examination DTRs: symmetric, 2+ in b/l triceps, 2+ in b/l biceps, 2+ in b/l brachioradialis, 2+ in bilateral patellars, 1+ in bilateral Achilles, Babinskis negative bilaterally Gait: narrow based, steady

## 2024-10-01 NOTE — DISCUSSION/SUMMARY
[FreeTextEntry1] : Ms. Costa is a 68 year old woman with a history of migraines who presents with over one week of headache. She has a non-focal neurological exam. A recent head CT was unremarkable. She was prescribed antibiotics on 9/29/24, for otitis media.   We discussed different treatment options for headaches - daily preventative medications and abortive medications.  -She does not typically have such frequent headaches. -Suggest a trial of magnesium glycinate 400 mg/day or Migrelief (magnesium, riboflavin, feverfew) as a daily supplement. Advised that this may cause diarrhea. This may help decrease frequency. -Will also prescribe Nurtec ODT 75 mg. My hope is that effective treatment of her migraines will prevent having many days of headache.  It will likely take some time to get insurance approval. Samples will be given. I advised her not to take more than 1 tablet in a 24 hour period.  -Will obtain MRI brain.  Will arrange for f/u.

## 2024-10-03 RX ORDER — RIMEGEPANT SULFATE 75 MG/75MG
75 TABLET, ORALLY DISINTEGRATING ORAL
Qty: 8 | Refills: 2 | Status: ACTIVE | COMMUNITY
Start: 2024-10-01 | End: 1900-01-01

## 2024-10-07 ENCOUNTER — NON-APPOINTMENT (OUTPATIENT)
Age: 69
End: 2024-10-07

## 2024-10-09 ENCOUNTER — TRANSCRIPTION ENCOUNTER (OUTPATIENT)
Age: 69
End: 2024-10-09

## 2024-10-09 ENCOUNTER — OUTPATIENT (OUTPATIENT)
Dept: OUTPATIENT SERVICES | Facility: HOSPITAL | Age: 69
LOS: 1 days | End: 2024-10-09
Payer: MEDICAID

## 2024-10-09 ENCOUNTER — OUTPATIENT (OUTPATIENT)
Dept: OUTPATIENT SERVICES | Facility: HOSPITAL | Age: 69
LOS: 1 days | End: 2024-10-09

## 2024-10-09 ENCOUNTER — APPOINTMENT (OUTPATIENT)
Dept: CT IMAGING | Facility: CLINIC | Age: 69
End: 2024-10-09
Payer: MEDICAID

## 2024-10-09 VITALS
TEMPERATURE: 98 F | SYSTOLIC BLOOD PRESSURE: 128 MMHG | HEART RATE: 54 BPM | OXYGEN SATURATION: 98 % | DIASTOLIC BLOOD PRESSURE: 61 MMHG | HEIGHT: 63 IN | RESPIRATION RATE: 16 BRPM | WEIGHT: 180.78 LBS

## 2024-10-09 DIAGNOSIS — Z98.890 OTHER SPECIFIED POSTPROCEDURAL STATES: Chronic | ICD-10-CM

## 2024-10-09 DIAGNOSIS — Z29.9 ENCOUNTER FOR PROPHYLACTIC MEASURES, UNSPECIFIED: ICD-10-CM

## 2024-10-09 DIAGNOSIS — M25.552 PAIN IN LEFT HIP: ICD-10-CM

## 2024-10-09 DIAGNOSIS — Z96.642 PRESENCE OF LEFT ARTIFICIAL HIP JOINT: Chronic | ICD-10-CM

## 2024-10-09 DIAGNOSIS — Z00.8 ENCOUNTER FOR OTHER GENERAL EXAMINATION: ICD-10-CM

## 2024-10-09 DIAGNOSIS — Z01.818 ENCOUNTER FOR OTHER PREPROCEDURAL EXAMINATION: ICD-10-CM

## 2024-10-09 LAB
A1C WITH ESTIMATED AVERAGE GLUCOSE RESULT: 5.5 % — SIGNIFICANT CHANGE UP (ref 4–5.6)
APTT BLD: 31.6 SEC — SIGNIFICANT CHANGE UP (ref 24.5–35.6)
ESTIMATED AVERAGE GLUCOSE: 111 MG/DL — SIGNIFICANT CHANGE UP (ref 68–114)
INR BLD: 1.03 RATIO — SIGNIFICANT CHANGE UP (ref 0.85–1.16)
MRSA PCR RESULT.: SIGNIFICANT CHANGE UP
PROTHROM AB SERPL-ACNC: 12.1 SEC — SIGNIFICANT CHANGE UP (ref 9.9–13.4)
S AUREUS DNA NOSE QL NAA+PROBE: SIGNIFICANT CHANGE UP

## 2024-10-09 PROCEDURE — 85730 THROMBOPLASTIN TIME PARTIAL: CPT

## 2024-10-09 PROCEDURE — 85610 PROTHROMBIN TIME: CPT

## 2024-10-09 PROCEDURE — 87641 MR-STAPH DNA AMP PROBE: CPT

## 2024-10-09 PROCEDURE — 99214 OFFICE O/P EST MOD 30 MIN: CPT | Mod: 25

## 2024-10-09 PROCEDURE — 73700 CT LOWER EXTREMITY W/O DYE: CPT | Mod: 26,LT,MH

## 2024-10-09 PROCEDURE — 83036 HEMOGLOBIN GLYCOSYLATED A1C: CPT

## 2024-10-09 PROCEDURE — 36415 COLL VENOUS BLD VENIPUNCTURE: CPT

## 2024-10-09 PROCEDURE — 87640 STAPH A DNA AMP PROBE: CPT | Mod: 59

## 2024-10-09 NOTE — H&P PST ADULT - NSANTHTIREDRD_ENT_A_CORE
Mom states the child has had increased frequency with urination. Also, he has been having frequent accidents. He denies burning or fever. Mom states there is a family history of diabetes. Appointment made.  
No

## 2024-10-09 NOTE — H&P PST ADULT - CARDIOVASCULAR COMMENTS
preop evaluation done with ANDRÉS Poon for optimization for surgery Bilateral ankle edema, non pitting, right +2, left +1

## 2024-10-09 NOTE — H&P PST ADULT - PROBLEM SELECTOR PLAN 1
Total hip and total knee replacement:  Caprini score 9 or less: LOW risk  Caprini score 10 or highter: HIGH RISK

## 2024-10-09 NOTE — H&P PST ADULT - ASSESSMENT
68 y.o female scheduled for   Plan  1. Stop all NSAIDS, herbal supplements and vitamins for 7 days.  2. NPO at midnight.  3. Take the following medications --none---with small sips of water on the morning of your procedure/surgery.  4. Use EZ sponges as directed  5. Use mupirocin as directed  6. Labs, EKG, CXR, MRSA swab as per surgeon  7. PMD/cardiologist visit for optimization prior to surgery as per surgeon.  8. Joint class completed today  9. Patient adonis require a rolling walker at home due to their dx of arthritis to help complete the MRADL's   10. Preop education provided including Joint education booklet  68 y.o female scheduled for Left Hip Conversion to Total Hip Arthroplasty with Removal of Hardware   Plan  1. Stop all NSAIDS, herbal supplements and vitamins for 7 days.  2. NPO at midnight.  3. Take the following medications --none---with small sips of water on the morning of your procedure/surgery.  4. Use EZ sponges as directed  5. Use mupirocin as directed  6. Labs (CBC, CMP on chart, PT/PTT/INR, A1C done today, EKG - on chart, MRSA swab done today as per surgeon  7. PMD/cardiologist  ANDRÉS Poon visit for optimization prior to surgery as per surgeon.  8. Joint class to be  completed today  9. Patient will require a rolling walker at home due to their dx of arthritis to help complete the MRADL's   10. Preop education provided including Joint education booklet     CAPRINI SCORE Version 2013    AGE RELATED RISK FACTORS                                                             [ ] Age 41-60 years             [1 point]  [x ] Age: 61-74 years            [2 points]                 [ ] Age 75 years or over     [3 points]             DISEASE RELATED RISK FACTORS                                                       [ ] Current swollen legs (pitting edema of any level)     [1 point]                     [ ] Varicose veins (visible, bulging vein, not spider veins or surgically removed veins)   [1 point]                                 [x ] BMI > 25 Kg/m2                       [1 point]  [ ] BMI > 40 kg/m2                       [1 point]                                 [ ] Serious infection (within past month, requiring hospitalization and IV antibiotics)    [1 point]                     [ ] Lung disease ( interstitial: COPD, emphysema, sarcoid, 9-11 illness, NOT asthma)       [1 point]                                                                          [ ] Acute myocardial infarction (within past month)      [1 point]                  [ ] Congestive heart failure (episode within past month or taking CHF meds)                   [1 point]         [ ] Inflammatory bowel disease (Crohns disease or ulcerative colitis, not irritable bowel syndrome)   [1 point]                  [ ] Central venous access, PICC line or Port (within past month)     [2 points]                                                             [ ] Stroke (within past month)     [5 points]    [ ] Previous or present malignancy (includes melanoma but not basal cell carcinoma, each incidence of cancer scores 2 points-not metastases)  [2 points]                                                                                                                                                         HEMATOLOGY RELATED FACTORS                                                         [ ] History of DVT or PE (including superficial venous thrombosis)    [3 points]                    [ ] Positive family history for DVT or PE (includes first-, second-, and third-degree relatives)   [3 points]   [ ] Personal or family history of genetic thrombophilia (family history counts only if it has not been confirmed that patient does not have this genetic marker)                       [ ] Prothrombin [id number] mutation                   [3 points]                           [ ] Factor V Leiden                                               [3 points]            [ ] Antithrombin III deficiency                           [3 points]         [ ] Protein C & S deficiency                                [3 points]              [ ] Dysfibrinogenemia                                         [3 points]  [ ] Personal history of acquired thrombophilia                       [ ] Lupus anticoagulant                                       [3 points]                                                                  [ ] Anticardiolipin antibodies                             [3 points]              [ ] Antiphospholipid antibodies                         [3 points]                                                         [ ] High homocysteine in the blood                  [3 points]                                                    [ ] Myeloproliferative disorders (including thrombocytosis)    [3 points]            [ ] HIV                                                                     [3 points]                                                    [ ] Heparin induced thrombocytopenia            [3 points]                                        MOBILITY RELATED FACTORS  [ ] Bed rest or restricted mobility (inability to ambulate 30 feet continuously or removable leg brace) for less than 72 hours    [1 point]  [ ] Nonremovable plaster cast or mold that prevents calf muscle use   [2 points]  [ ] Bed bound  or restricted mobility for more than 72 hours                  [2 points]    GENDER SPECIFIC FACTORS  [ ] Pregnancy or had a baby within the last month   [1 point]  [ ] Hormone therapy  or oral contraception               [1 point]  [ ] Current use of estrogen-like drugs (raloxifine, tamoxifen, anastrozole, letrozole)                                [1 point]  [ ] History of unexplained stillborn infant, premature birth with toxemia or growth-restricted infant   [1 point]  [ ] Recurrent spontaneous abortions (3 or more)      [1 point]    OTHER RISK FACTORS                                         [ ] Current smoker (includes vaping and smoking marijuana)      [1 point]  [ ] Diabetes requiring insulin     [1 point]                   [ ] Chemotherapy (includes methotrexate for rheumatoid arthritis, hydroxyurea for thrombocytosis)    [1 point]  [ ] Blood transfusion(s)               [1 point]    SURGERY RELATED RISK FACTORS  [ ]  section within the last month                    [1 point]  [ ] Minor surgery is planned (less than 45 minutes)     [1 point]  [ ] Past major surgery (longer than 45 minutes) within past month  [2 points]  [ ] Planned major surgery lasting more than 45 minutes (includes laparoscopic and arthroscopic; do not add to the "5" for hip and knee replacement)    [2 points]  [ ] Length of surgery over 2 hours (includes anesthesia time; do not add to the "5" for hip and knee replacement)   [1 point]  [x ] Elective hip or knee joint replacement surgery         [5 points]                                               TRAUMA RELATED RISK FACTORS  [ ] Fracture of the hip, pelvis, or leg                       [5 points]  [ ] Spinal cord injury resulting in paralysis (within the past month)    [5 points]  [ ] Paralysis  (within the past month)                      [5 points]  [ ] Multiple trauma (within the past month)         [5 Points]    Total Score [     8   ]    Total hip and total knee replacement:  Caprini score 9 or less: LOW risk  Caprini score 10 or highter: HIGH RISK    Caprini score 0-2: Low Risk, NO VTE prophylaxis required for most patients, encourage ambulation  Caprini score 3-6: Moderate Risk , pharmacologic VTE prophylaxis is indicated for most patients (in the absence of contraindications)  Caprini score 7 or higher: High risk, pharmocologic VTE prophylaxis indicated for most patients (in the absence of contraindications)

## 2024-10-09 NOTE — H&P PST ADULT - PATIENT ON (OXYGEN DELIVERY METHOD)
room air [Mother] : mother [whole ___ oz/d] : consumes [unfilled] oz of whole cow's milk per day [Fruit] : fruit [Vegetables] : vegetables [Fish] : fish [___ voids per day] : [unfilled] voids per day [Normal] : Normal [In own bed] : In own bed [Brushing teeth] : Brushing teeth [In Pre-K] : In Pre-K [Appropiate parent-child communication] : Appropriate parent-child communication [Child given choices] : Child given choices [Parent has appropriate responses to behavior] : Parent has appropriate responses to behavior [No] : No cigarette smoke exposure [Car seat in back seat] : Car seat in back seat [Carbon Monoxide Detectors] : Carbon monoxide detectors [Supervised outdoor play] : Supervised outdoor play [Exposure to electronic nicotine delivery system] : No exposure to electronic nicotine delivery system [FreeTextEntry7] : dyarticualtion [FreeTextEntry9] : ST 4 x week

## 2024-10-09 NOTE — H&P PST ADULT - NSICDXPASTMEDICALHX_GEN_ALL_CORE_FT
PAST MEDICAL HISTORY:  H/O fracture of left hip     H/O headache     History of fracture of left ankle     Left hip pain

## 2024-10-09 NOTE — H&P PST ADULT - NSICDXPASTSURGICALHX_GEN_ALL_CORE_FT
PAST SURGICAL HISTORY:  H/O colonoscopy     History of ankle surgery     History of esophagogastroduodenoscopy (EGD)     History of hemiarthroplasty of left hip

## 2024-10-09 NOTE — H&P PST ADULT - HISTORY OF PRESENT ILLNESS
68 y.o WD, WN Tajik speaking female presents to PST with hx of left hip pain .  68 y.o WD, WN Danish speaking female presents to PST with hx of left hip pain . Patient with hx of a left hip fracture in October 2022. She had surgery at that time. She has been experiencing left hip pain, worse with ambulation. Followed with ortho, diagnostics indicating an issue with the prosthetic in place, requires replacement. She has discussed with ortho and scheduled for a Left Hip Conversion to Total Hip Arthroplasty with Removal of Hardware

## 2024-10-10 ENCOUNTER — APPOINTMENT (OUTPATIENT)
Dept: MRI IMAGING | Facility: CLINIC | Age: 69
End: 2024-10-10

## 2024-10-10 ENCOUNTER — OUTPATIENT (OUTPATIENT)
Dept: OUTPATIENT SERVICES | Facility: HOSPITAL | Age: 69
LOS: 1 days | End: 2024-10-10
Payer: MEDICAID

## 2024-10-10 DIAGNOSIS — Z01.818 ENCOUNTER FOR OTHER PREPROCEDURAL EXAMINATION: ICD-10-CM

## 2024-10-10 DIAGNOSIS — Z98.890 OTHER SPECIFIED POSTPROCEDURAL STATES: Chronic | ICD-10-CM

## 2024-10-10 DIAGNOSIS — G43.909 MIGRAINE, UNSPECIFIED, NOT INTRACTABLE, WITHOUT STATUS MIGRAINOSUS: ICD-10-CM

## 2024-10-10 DIAGNOSIS — Z96.642 PRESENCE OF LEFT ARTIFICIAL HIP JOINT: Chronic | ICD-10-CM

## 2024-10-10 PROCEDURE — 70551 MRI BRAIN STEM W/O DYE: CPT | Mod: 26

## 2024-10-11 PROBLEM — Z87.81 PERSONAL HISTORY OF (HEALED) TRAUMATIC FRACTURE: Chronic | Status: ACTIVE | Noted: 2024-10-09

## 2024-10-12 PROBLEM — Z87.898 PERSONAL HISTORY OF OTHER SPECIFIED CONDITIONS: Chronic | Status: ACTIVE | Noted: 2024-10-09

## 2024-10-12 PROBLEM — M25.552 PAIN IN LEFT HIP: Chronic | Status: ACTIVE | Noted: 2024-10-09

## 2024-10-14 RX ORDER — CELECOXIB 100 MG
200 CAPSULE ORAL EVERY 12 HOURS
Refills: 0 | Status: DISCONTINUED | OUTPATIENT
Start: 2024-10-21 | End: 2024-10-23

## 2024-10-14 RX ORDER — B-COMPLEX WITH VITAMIN C
1 VIAL (ML) INJECTION DAILY
Refills: 0 | Status: DISCONTINUED | OUTPATIENT
Start: 2024-10-21 | End: 2024-10-23

## 2024-10-14 RX ORDER — TRANEXAMIC ACID 650 MG/1
1000 TABLET ORAL ONCE
Refills: 0 | Status: DISCONTINUED | OUTPATIENT
Start: 2024-10-21 | End: 2024-10-23

## 2024-10-14 RX ORDER — ONDANSETRON HYDROCHLORIDE 2 MG/ML
4 INJECTION, SOLUTION INTRAMUSCULAR; INTRAVENOUS EVERY 6 HOURS
Refills: 0 | Status: DISCONTINUED | OUTPATIENT
Start: 2024-10-21 | End: 2024-10-23

## 2024-10-14 RX ORDER — HYDROMORPHONE HCL/0.9% NACL/PF 6 MG/30 ML
0.5 PATIENT CONTROLLED ANALGESIA SYRINGE INTRAVENOUS
Refills: 0 | Status: DISCONTINUED | OUTPATIENT
Start: 2024-10-21 | End: 2024-10-23

## 2024-10-14 RX ORDER — SENNA 187 MG
2 TABLET ORAL AT BEDTIME
Refills: 0 | Status: DISCONTINUED | OUTPATIENT
Start: 2024-10-21 | End: 2024-10-23

## 2024-10-14 RX ORDER — ACETAMINOPHEN 500 MG
1000 TABLET ORAL EVERY 8 HOURS
Refills: 0 | Status: DISCONTINUED | OUTPATIENT
Start: 2024-10-21 | End: 2024-10-23

## 2024-10-14 RX ORDER — DEXAMETHASONE 1.5 MG 1.5 MG/1
8 TABLET ORAL ONCE
Refills: 0 | Status: COMPLETED | OUTPATIENT
Start: 2024-10-22 | End: 2024-10-22

## 2024-10-14 RX ORDER — OXYCODONE HYDROCHLORIDE 30 MG/1
10 TABLET ORAL
Refills: 0 | Status: DISCONTINUED | OUTPATIENT
Start: 2024-10-21 | End: 2024-10-23

## 2024-10-14 RX ORDER — POLYETHYLENE GLYCOL 3350 17 G/17G
17 POWDER, FOR SOLUTION ORAL AT BEDTIME
Refills: 0 | Status: DISCONTINUED | OUTPATIENT
Start: 2024-10-21 | End: 2024-10-23

## 2024-10-14 RX ORDER — OXYCODONE HYDROCHLORIDE 30 MG/1
5 TABLET ORAL
Refills: 0 | Status: DISCONTINUED | OUTPATIENT
Start: 2024-10-21 | End: 2024-10-23

## 2024-10-14 RX ORDER — DIETHYLTOLUAMIDE 7 %
1 SPRAY, NON-AEROSOL (ML) TOPICAL THREE TIMES A DAY
Refills: 0 | Status: DISCONTINUED | OUTPATIENT
Start: 2024-10-21 | End: 2024-10-23

## 2024-10-14 RX ORDER — ASPIRIN/MAG CARB/ALUMINUM AMIN 325 MG
81 TABLET ORAL EVERY 12 HOURS
Refills: 0 | Status: DISCONTINUED | OUTPATIENT
Start: 2024-10-22 | End: 2024-10-23

## 2024-10-14 RX ORDER — PANTOPRAZOLE SODIUM 40 MG/1
40 TABLET, DELAYED RELEASE ORAL
Refills: 0 | Status: DISCONTINUED | OUTPATIENT
Start: 2024-10-21 | End: 2024-10-23

## 2024-10-14 RX ORDER — ACETAMINOPHEN 500 MG
1000 TABLET ORAL EVERY 8 HOURS
Refills: 0 | Status: COMPLETED | OUTPATIENT
Start: 2024-10-21 | End: 2024-10-22

## 2024-10-16 ENCOUNTER — NON-APPOINTMENT (OUTPATIENT)
Age: 69
End: 2024-10-16

## 2024-10-17 ENCOUNTER — APPOINTMENT (OUTPATIENT)
Dept: CT IMAGING | Facility: CLINIC | Age: 69
End: 2024-10-17

## 2024-10-17 ENCOUNTER — OUTPATIENT (OUTPATIENT)
Dept: OUTPATIENT SERVICES | Facility: HOSPITAL | Age: 69
LOS: 1 days | End: 2024-10-17
Payer: MEDICARE

## 2024-10-17 DIAGNOSIS — Z98.890 OTHER SPECIFIED POSTPROCEDURAL STATES: Chronic | ICD-10-CM

## 2024-10-17 DIAGNOSIS — Z96.649 PRESENCE OF UNSPECIFIED ARTIFICIAL HIP JOINT: ICD-10-CM

## 2024-10-17 DIAGNOSIS — Z96.642 PRESENCE OF LEFT ARTIFICIAL HIP JOINT: Chronic | ICD-10-CM

## 2024-10-17 PROCEDURE — 73700 CT LOWER EXTREMITY W/O DYE: CPT | Mod: 26,LT,MH

## 2024-10-18 ENCOUNTER — APPOINTMENT (OUTPATIENT)
Dept: CARDIOLOGY | Facility: CLINIC | Age: 69
End: 2024-10-18
Payer: MEDICARE

## 2024-10-18 VITALS
WEIGHT: 180 LBS | TEMPERATURE: 98 F | SYSTOLIC BLOOD PRESSURE: 114 MMHG | HEIGHT: 62 IN | HEART RATE: 77 BPM | OXYGEN SATURATION: 97 % | DIASTOLIC BLOOD PRESSURE: 78 MMHG | BODY MASS INDEX: 33.13 KG/M2

## 2024-10-18 DIAGNOSIS — G43.909 MIGRAINE, UNSPECIFIED, NOT INTRACTABLE, W/OUT STATUS MIGRAINOSUS: ICD-10-CM

## 2024-10-18 DIAGNOSIS — M25.559 PAIN IN UNSPECIFIED HIP: ICD-10-CM

## 2024-10-18 DIAGNOSIS — M25.552 PAIN IN LEFT HIP: ICD-10-CM

## 2024-10-18 DIAGNOSIS — R00.2 PALPITATIONS: ICD-10-CM

## 2024-10-18 DIAGNOSIS — Z96.649 PRESENCE OF UNSPECIFIED ARTIFICIAL HIP JOINT: ICD-10-CM

## 2024-10-18 PROCEDURE — 99215 OFFICE O/P EST HI 40 MIN: CPT

## 2024-10-21 ENCOUNTER — RESULT REVIEW (OUTPATIENT)
Age: 69
End: 2024-10-21

## 2024-10-21 ENCOUNTER — INPATIENT (INPATIENT)
Facility: HOSPITAL | Age: 69
LOS: 1 days | Discharge: HOME HEALTH | DRG: 468 | End: 2024-10-23
Attending: STUDENT IN AN ORGANIZED HEALTH CARE EDUCATION/TRAINING PROGRAM | Admitting: STUDENT IN AN ORGANIZED HEALTH CARE EDUCATION/TRAINING PROGRAM
Payer: MEDICAID

## 2024-10-21 ENCOUNTER — APPOINTMENT (OUTPATIENT)
Dept: ORTHOPEDIC SURGERY | Facility: HOSPITAL | Age: 69
End: 2024-10-21

## 2024-10-21 ENCOUNTER — NON-APPOINTMENT (OUTPATIENT)
Age: 69
End: 2024-10-21

## 2024-10-21 ENCOUNTER — TRANSCRIPTION ENCOUNTER (OUTPATIENT)
Age: 69
End: 2024-10-21

## 2024-10-21 VITALS
HEIGHT: 63 IN | SYSTOLIC BLOOD PRESSURE: 133 MMHG | HEART RATE: 65 BPM | WEIGHT: 180.78 LBS | DIASTOLIC BLOOD PRESSURE: 81 MMHG | OXYGEN SATURATION: 98 % | TEMPERATURE: 98 F | RESPIRATION RATE: 16 BRPM

## 2024-10-21 DIAGNOSIS — Z96.642 PRESENCE OF LEFT ARTIFICIAL HIP JOINT: Chronic | ICD-10-CM

## 2024-10-21 DIAGNOSIS — Z98.890 OTHER SPECIFIED POSTPROCEDURAL STATES: Chronic | ICD-10-CM

## 2024-10-21 DIAGNOSIS — Z96.649 PRESENCE OF UNSPECIFIED ARTIFICIAL HIP JOINT: ICD-10-CM

## 2024-10-21 DIAGNOSIS — M25.552 PAIN IN LEFT HIP: ICD-10-CM

## 2024-10-21 LAB
ANION GAP SERPL CALC-SCNC: 6 MMOL/L — SIGNIFICANT CHANGE UP (ref 5–17)
BUN SERPL-MCNC: 12 MG/DL — SIGNIFICANT CHANGE UP (ref 7–23)
CALCIUM SERPL-MCNC: 7.9 MG/DL — LOW (ref 8.5–10.1)
CHLORIDE SERPL-SCNC: 109 MMOL/L — HIGH (ref 96–108)
CO2 SERPL-SCNC: 23 MMOL/L — SIGNIFICANT CHANGE UP (ref 22–31)
CREAT SERPL-MCNC: 0.57 MG/DL — SIGNIFICANT CHANGE UP (ref 0.5–1.3)
EGFR: 98 ML/MIN/1.73M2 — SIGNIFICANT CHANGE UP
GLUCOSE BLDC GLUCOMTR-MCNC: 100 MG/DL — HIGH (ref 70–99)
GLUCOSE SERPL-MCNC: 144 MG/DL — HIGH (ref 70–99)
HCT VFR BLD CALC: 33.8 % — LOW (ref 34.5–45)
HGB BLD-MCNC: 11.2 G/DL — LOW (ref 11.5–15.5)
MCHC RBC-ENTMCNC: 31.5 PG — SIGNIFICANT CHANGE UP (ref 27–34)
MCHC RBC-ENTMCNC: 33.1 GM/DL — SIGNIFICANT CHANGE UP (ref 32–36)
MCV RBC AUTO: 94.9 FL — SIGNIFICANT CHANGE UP (ref 80–100)
PLATELET # BLD AUTO: 215 K/UL — SIGNIFICANT CHANGE UP (ref 150–400)
POTASSIUM SERPL-MCNC: 3.3 MMOL/L — LOW (ref 3.5–5.3)
POTASSIUM SERPL-SCNC: 3.3 MMOL/L — LOW (ref 3.5–5.3)
RBC # BLD: 3.56 M/UL — LOW (ref 3.8–5.2)
RBC # FLD: 13 % — SIGNIFICANT CHANGE UP (ref 10.3–14.5)
SODIUM SERPL-SCNC: 138 MMOL/L — SIGNIFICANT CHANGE UP (ref 135–145)
WBC # BLD: 12.78 K/UL — HIGH (ref 3.8–10.5)
WBC # FLD AUTO: 12.78 K/UL — HIGH (ref 3.8–10.5)

## 2024-10-21 PROCEDURE — 87075 CULTR BACTERIA EXCEPT BLOOD: CPT

## 2024-10-21 PROCEDURE — 87102 FUNGUS ISOLATION CULTURE: CPT

## 2024-10-21 PROCEDURE — 99222 1ST HOSP IP/OBS MODERATE 55: CPT

## 2024-10-21 PROCEDURE — 73501 X-RAY EXAM HIP UNI 1 VIEW: CPT | Mod: 26,LT

## 2024-10-21 PROCEDURE — C9399: CPT

## 2024-10-21 PROCEDURE — 20985 CPTR-ASST DIR MS PX: CPT

## 2024-10-21 PROCEDURE — 27132 TOTAL HIP ARTHROPLASTY: CPT | Mod: LT

## 2024-10-21 PROCEDURE — 73501 X-RAY EXAM HIP UNI 1 VIEW: CPT | Mod: LT

## 2024-10-21 PROCEDURE — 36415 COLL VENOUS BLD VENIPUNCTURE: CPT

## 2024-10-21 PROCEDURE — 87070 CULTURE OTHR SPECIMN AEROBIC: CPT

## 2024-10-21 PROCEDURE — 99254 IP/OBS CNSLTJ NEW/EST MOD 60: CPT

## 2024-10-21 PROCEDURE — 88300 SURGICAL PATH GROSS: CPT

## 2024-10-21 PROCEDURE — 85027 COMPLETE CBC AUTOMATED: CPT

## 2024-10-21 PROCEDURE — 80048 BASIC METABOLIC PNL TOTAL CA: CPT

## 2024-10-21 PROCEDURE — 82962 GLUCOSE BLOOD TEST: CPT

## 2024-10-21 PROCEDURE — 97607 NEG PRS WND THR NDME<=50SQCM: CPT

## 2024-10-21 PROCEDURE — 11981 INSERTION DRUG DLVR IMPLANT: CPT

## 2024-10-21 PROCEDURE — C1776: CPT

## 2024-10-21 PROCEDURE — 97530 THERAPEUTIC ACTIVITIES: CPT | Mod: GP

## 2024-10-21 PROCEDURE — 97166 OT EVAL MOD COMPLEX 45 MIN: CPT | Mod: GO

## 2024-10-21 PROCEDURE — C1713: CPT

## 2024-10-21 PROCEDURE — C1889: CPT

## 2024-10-21 PROCEDURE — 97116 GAIT TRAINING THERAPY: CPT | Mod: GP

## 2024-10-21 PROCEDURE — 88300 SURGICAL PATH GROSS: CPT | Mod: 26

## 2024-10-21 PROCEDURE — 97162 PT EVAL MOD COMPLEX 30 MIN: CPT | Mod: GP

## 2024-10-21 RX ORDER — APREPITANT 40 MG/1
40 CAPSULE ORAL ONCE
Refills: 0 | Status: COMPLETED | OUTPATIENT
Start: 2024-10-21 | End: 2024-10-21

## 2024-10-21 RX ORDER — FENTANYL CITRAT/DEXTROSE 5%/PF 1250MCG/50
50 PATIENT CONTROLLED ANALGESIA SYRINGE INTRAVENOUS
Refills: 0 | Status: DISCONTINUED | OUTPATIENT
Start: 2024-10-21 | End: 2024-10-21

## 2024-10-21 RX ORDER — OXYCODONE HYDROCHLORIDE 30 MG/1
1 TABLET ORAL
Qty: 20 | Refills: 0
Start: 2024-10-21 | End: 2024-10-25

## 2024-10-21 RX ORDER — OXYCODONE HYDROCHLORIDE 30 MG/1
5 TABLET ORAL ONCE
Refills: 0 | Status: DISCONTINUED | OUTPATIENT
Start: 2024-10-21 | End: 2024-10-21

## 2024-10-21 RX ORDER — CEFADROXIL 250 MG/5ML
1 POWDER, FOR SUSPENSION ORAL
Qty: 14 | Refills: 0
Start: 2024-10-21 | End: 2024-10-27

## 2024-10-21 RX ORDER — ONDANSETRON HYDROCHLORIDE 2 MG/ML
4 INJECTION, SOLUTION INTRAMUSCULAR; INTRAVENOUS ONCE
Refills: 0 | Status: DISCONTINUED | OUTPATIENT
Start: 2024-10-21 | End: 2024-10-21

## 2024-10-21 RX ORDER — CEFADROXIL 250 MG/5ML
500 POWDER, FOR SUSPENSION ORAL
Refills: 0 | Status: DISCONTINUED | OUTPATIENT
Start: 2024-10-22 | End: 2024-10-23

## 2024-10-21 RX ORDER — SENNA 187 MG
1 TABLET ORAL
Qty: 7 | Refills: 0
Start: 2024-10-21 | End: 2024-10-27

## 2024-10-21 RX ORDER — CELECOXIB 100 MG
1 CAPSULE ORAL
Qty: 60 | Refills: 0
Start: 2024-10-21 | End: 2024-11-19

## 2024-10-21 RX ORDER — ACETAMINOPHEN 500 MG
2 TABLET ORAL
Qty: 84 | Refills: 0
Start: 2024-10-21 | End: 2024-11-03

## 2024-10-21 RX ORDER — INFLUENZ VIR VAC TV P-SURF2003 15MCG/.5ML
0.5 SYRINGE (ML) INTRAMUSCULAR ONCE
Refills: 0 | Status: COMPLETED | OUTPATIENT
Start: 2024-10-21 | End: 2024-10-21

## 2024-10-21 RX ORDER — ASPIRIN/MAG CARB/ALUMINUM AMIN 325 MG
1 TABLET ORAL
Qty: 56 | Refills: 0
Start: 2024-10-21 | End: 2024-11-17

## 2024-10-21 RX ORDER — PANTOPRAZOLE SODIUM 40 MG/1
1 TABLET, DELAYED RELEASE ORAL
Qty: 30 | Refills: 0
Start: 2024-10-21 | End: 2024-11-19

## 2024-10-21 RX ORDER — CEFAZOLIN SODIUM 1 G
2000 VIAL (EA) INJECTION EVERY 8 HOURS
Refills: 0 | Status: COMPLETED | OUTPATIENT
Start: 2024-10-21 | End: 2024-10-21

## 2024-10-21 RX ORDER — HYDROMORPHONE HCL/0.9% NACL/PF 6 MG/30 ML
0.5 PATIENT CONTROLLED ANALGESIA SYRINGE INTRAVENOUS
Refills: 0 | Status: DISCONTINUED | OUTPATIENT
Start: 2024-10-21 | End: 2024-10-21

## 2024-10-21 RX ORDER — CEFAZOLIN SODIUM 1 G
2000 VIAL (EA) INJECTION EVERY 8 HOURS
Refills: 0 | Status: DISCONTINUED | OUTPATIENT
Start: 2024-10-21 | End: 2024-10-21

## 2024-10-21 RX ORDER — POTASSIUM CHLORIDE 10 MEQ
40 TABLET, EXTENDED RELEASE ORAL ONCE
Refills: 0 | Status: COMPLETED | OUTPATIENT
Start: 2024-10-21 | End: 2024-10-21

## 2024-10-21 RX ORDER — POLYETHYLENE GLYCOL 3350 17 G/17G
17 POWDER, FOR SOLUTION ORAL
Qty: 1 | Refills: 0
Start: 2024-10-21

## 2024-10-21 RX ADMIN — Medication 0.5 MILLIGRAM(S): at 13:00

## 2024-10-21 RX ADMIN — Medication 0.5 MILLIGRAM(S): at 12:45

## 2024-10-21 RX ADMIN — Medication 200 MILLIGRAM(S): at 22:17

## 2024-10-21 RX ADMIN — OXYCODONE HYDROCHLORIDE 10 MILLIGRAM(S): 30 TABLET ORAL at 15:15

## 2024-10-21 RX ADMIN — Medication 200 MILLIGRAM(S): at 21:47

## 2024-10-21 RX ADMIN — Medication 2 TABLET(S): at 21:48

## 2024-10-21 RX ADMIN — POLYETHYLENE GLYCOL 3350 17 GRAM(S): 17 POWDER, FOR SOLUTION ORAL at 21:47

## 2024-10-21 RX ADMIN — Medication 125 MILLILITER(S): at 12:39

## 2024-10-21 RX ADMIN — Medication 1000 MILLIGRAM(S): at 20:24

## 2024-10-21 RX ADMIN — Medication 500 MILLILITER(S): at 12:05

## 2024-10-21 RX ADMIN — Medication 100 MILLILITER(S): at 14:37

## 2024-10-21 RX ADMIN — Medication 2000 MILLIGRAM(S): at 16:16

## 2024-10-21 RX ADMIN — Medication 500 MILLILITER(S): at 14:39

## 2024-10-21 RX ADMIN — Medication 2000 MILLIGRAM(S): at 23:03

## 2024-10-21 RX ADMIN — Medication 1 TABLET(S): at 21:47

## 2024-10-21 RX ADMIN — OXYCODONE HYDROCHLORIDE 10 MILLIGRAM(S): 30 TABLET ORAL at 14:36

## 2024-10-21 RX ADMIN — Medication 40 MILLIEQUIVALENT(S): at 16:17

## 2024-10-21 RX ADMIN — Medication 400 MILLIGRAM(S): at 20:17

## 2024-10-21 RX ADMIN — APREPITANT 40 MILLIGRAM(S): 40 CAPSULE ORAL at 06:53

## 2024-10-21 NOTE — DISCHARGE NOTE PROVIDER - NSDCFUSCHEDAPPT_GEN_ALL_CORE_FT
[facility]ician [facility]  ORTHOSURG MINER [address]  Scheduled Appointment: 10/21/2024

## 2024-10-21 NOTE — PATIENT PROFILE ADULT - LANGUAGE ASSISTANCE NEEDED
phone used to ask pts permission for nephew to translate   # [phone], [place]/No-Patient/Caregiver offered and refused free interpretation services.

## 2024-10-21 NOTE — DISCHARGE NOTE PROVIDER - HOSPITAL COURSE
The patient is a 68y Female status post elective removal of hardware and left total hip Arthroplasty after failing outpatient nonoperative conservative management. Patient presented to [facility] after being medically cleared for an elective surgical procedure. The patient was taken to the operating room on date mentioned above. Prophylactic antibiotics were started before the procedure and continued for 24 hours. There were no complications during the procedure and patient tolerated the procedure well. The patient was transferred to the recovery room in stable condition and subsequently to the surgical floor. The patient was placed on Aspirin 81 BID for anticoagulation while in house. All home medications were continued. The patient received physical therapy daily and daily labs were followed. The dressing was kept clean, dry, intact. The rest of the hospital stay was unremarkable. Orthopedics H&P:  Pt is a 68y Female        Now s/p DAVID and Left Total Hip Arthroplasty. Pt is afebrile with stable vital signs. Pain is controlled. Alert and Oriented. Exam intact EHL FHL TA GS, +DP. Dressing is clean and dry.    Hospital Course:  Patient presented to [facility] medically cleared for elective Hip Replacement Surgery, having failed outpatient conservative management. Prophylactic antibiotics were started before the procedure and continued for 24 hours. They were admitted after surgery to the orthopedic floor.   There were no complications during the hospital stay. Appropriate home medications were continued.     Routine consults were obtained from Physical Therapy for twice daily PT and from the Hospitalist for Medical Co-management. Patient was placed on anticoagulation.  Pertinent home medications were continued.  Daily labs were followed.      POD 0 pt was stable overnight.  No Major issues post op. Pt received PT twice daily. The plan is for for DC to home with home PT*********. The orthopedic Attending is aware and agrees.    The patient's following condition(s) were actively treated or managed during the hospital stay:  Acute post op blood loss anemia due to surgery that was not clinically significant, required no intervention, and was monitored.    The patient had no post-operative complications and clinically progressed faster than anticipated.   The patient met criteria for discharge before the 2nd night of the stay. The patient was appropriately and safely discharged home with home PT.    ******ABOVE NOTE IN PREPARATION FOR DISPO PLANNING*******  ******ABOVE NOTE IN PREPARATION FOR DISPO PLANNING*******  ******ABOVE NOTE IN PREPARATION FOR DISPO PLANNING******* Orthopedics H&P:  Pt is a 68y Female        Now s/p DAVID and Left Total Hip Arthroplasty. Pt is afebrile with stable vital signs. Pain is controlled. Alert and Oriented. Exam intact EHL FHL TA GS, +DP. Dressing is clean and dry.    Hospital Course:  Patient presented to [facility] medically cleared for elective Hip Replacement Surgery, having failed outpatient conservative management. Prophylactic antibiotics were started before the procedure and continued for 24 hours. They were admitted after surgery to the orthopedic floor.   There were no complications during the hospital stay. Appropriate home medications were continued.     Routine consults were obtained from Physical Therapy for twice daily PT and from the Hospitalist for Medical Co-management. Patient was placed on anticoagulation.  Pertinent home medications were continued.  Daily labs were followed.      POD 0 pt was stable overnight.  No Major issues post op. Pt received PT twice daily. The plan is for for DC to home with home PT. The orthopedic Attending is aware and agrees.    The patient's following condition(s) were actively treated or managed during the hospital stay:  Acute post op blood loss anemia due to surgery that was not clinically significant, required no intervention, and was monitored.    The patient had no post-operative complications and clinically progressed faster than anticipated. The patient was appropriately and safely discharged home with home PT.

## 2024-10-21 NOTE — BRIEF OPERATIVE NOTE - NSICDXBRIEFPROCEDURE_GEN_ALL_CORE_FT
PROCEDURES:  Revision of left total hip arthroplasty by posterior approach 21-Oct-2024 11:54:04  Laya Ziegler

## 2024-10-21 NOTE — DISCHARGE NOTE PROVIDER - CARE PROVIDER_API CALL
Frantz Figueroa  Orthopaedic Surgery  [address] [place] [id number]  Phone: [phone]  Fax: [phone]  Follow Up Time:

## 2024-10-21 NOTE — DISCHARGE NOTE PROVIDER - NSDCFUADDINST_GEN_ALL_CORE_FT
Discharge Instructions for Total Hip Arthroplasty  1.  Diet: Resume previous diet  2. Activity: WBAT, Rolling walker, Daily PT. Walk plenty.   3. Call with: fever over 101, wound redness, drainage or open area, calf pain/calf swelling  4. Wound Care: Remove old and place new Aquacel  bandage to hip every 7 days.  5. OK to Shower with Aquacel; Must be and Aquacel bandage to shower.  Avoid direct water beating on bandage.  Continue ICE packs to hip.  6. DVT PE Prophylaxis: Aspirin 81 BID. See med rec for further instructions.  7. Follow Up: Dr. Figueroa in office in 2 weeks;  Call to schedule appointment.  8. Pain medications:  If going home, eRX sent to your pharmacy for pick  Discharge Instructions for Left Total Hip Arthroplasty:    1. ACTIVITY: WBAT. Rolling walker. Posterior Hip Dislocation Precautions. Abduction Pillow while in bed for 6 weeks. Daily PT.  2. CALL FOR: fever over 101, wound redness, drainage or open area, calf pain/calf swelling.  3. BANDAGE: On POD7 (10/28) Home PT to place a new Mepilex Ag bandage over incision.  Be careful not to removed mesh that is glued to the wound. There are no sutures or staples to remove. May change sooner ONLY if leaks or falls off. DO NOT REMOVE BANDAGE TO CHECK WOUND ON INTAKE. Dressing to be removed by Home PT on POD14 (11/4) and left open to air as long as the incision is dry; if there is continued drainage place a new dressing and instruct patient to call office and arrange follow up in the office on the next clinic day.  4. STAPLES: There are NO Staples to remove. Sutures are dissolvable.   5. SHOWER: Can shower. No Tub baths.  6. DVT PE Prophylaxis: Aspirin 81mg PO BID x 28 days. See Med Rec.  7. GI: Continue Protonix daily while on Anticoagulant. eRx has been sent to your pharmacy.  8. FOLLOW UP: Dr. Figueroa in 14 days. Call office to schedule.    9. MEDICATION: eRX sent to your pharmacy for  if you go home. DO NOT  or take any Rx sent from [facility] if going to REHAB.   10. **Call office if medications not covered under your insurance , especially BLOOD CLOT PREVENTION/anticoagulant medication.     Discharge Instructions for Left Total Hip Arthroplasty:    1. ACTIVITY: WBAT. Rolling walker. Posterior Hip Dislocation Precautions. Abduction Pillow while in bed for 6 weeks. Daily PT.  2. CALL FOR: fever over 101, wound redness, drainage or open area, calf pain/calf swelling.  3. BANDAGE: On POD7 (10/28) Home PT to place a new Mepilex Ag bandage over incision.  Be careful not to removed mesh that is glued to the wound. There are no sutures or staples to remove. May change sooner ONLY if leaks or falls off. DO NOT REMOVE BANDAGE TO CHECK WOUND ON INTAKE. Dressing to be removed by Home PT on POD14 (11/4) and left open to air as long as the incision is dry; if there is continued drainage place a new dressing and instruct patient to call office and arrange follow up in the office on the next clinic day.  4. STAPLES: There are NO Staples to remove. Sutures are dissolvable.   5. SHOWER: Can shower. No Tub baths.  6. DVT PE Prophylaxis: Aspirin 81mg PO BID x 28 days. See Med Rec.  7. GI: Continue Protonix daily while on Anticoagulant. eRx has been sent to your pharmacy.  8. FOLLOW UP: Dr. Figueroa in 14 days. Call office to schedule.    9. ANTIBIOTICS: Continue PO Duricef twice daily for 7 days upon discharge. eRx sent to the Pharmacy.   10. MEDICATION: eRX sent to your pharmacy for  if you go home. DO NOT  or take any Rx sent from [facility] if going to REHAB.   11. **Call office if medications not covered under your insurance , especially BLOOD CLOT PREVENTION/anticoagulant medication.

## 2024-10-21 NOTE — PATIENT PROFILE ADULT - INTERPRETATION SERVICES DECLINED
Chief Complaint   Patient presents with    Well Woman    Mammogram    left breast pain       History of Present Illness: Rhona An is a 61 y.o. female that presents today 9/10/2019 for well gyn visit.    Past Medical History:   Diagnosis Date    Anxiety     Arthritis     Chronic bronchitis     Colon polyp     Coronary artery disease     Depression     Diverticulosis     Encounter for blood transfusion     General anesthetics causing adverse effect in therapeutic use     GERD (gastroesophageal reflux disease)     History of Clostridium difficile     History of nephrolithiasis     Hyperlipidemia     Hypertension     Jaw pain        Past Surgical History:   Procedure Laterality Date    CARPAL TUNNEL RELEASE      right     SECTION      x 1    COLONOSCOPY N/A 2016    Performed [facility] at [facility] ENDO    EGD (ESOPHAGOGASTRODUODENOSCOPY) N/A 8/10/2018    [facility] ENDO    ESOPHAGOGASTRODUODENOSCOPY (EGD) N/A 2016    [facility] ENDO    ESOPHAGOGASTRODUODENOSCOPY (EGD) N/A 2015    [facility] ENDO    FINGER SURGERY      right thumb trigger finger release    HEMORRHOID SURGERY      HYSTERECTOMY      TONSILLECTOMY, ADENOIDECTOMY      UPPER GASTROINTESTINAL ENDOSCOPY  2015    Dr. Richmond       Current Outpatient Medications   Medication Sig Dispense Refill    amlodipine-valsartan (EXFORGE) 5-160 mg per tablet TAKE 1 TABLET DAILY EVERY EVENING 90 tablet 1    clonazePAM (KLONOPIN) 0.5 MG tablet Take 1 tablet (0.5 mg total) by mouth daily as needed for Anxiety. 30 tablet 0    cyclobenzaprine (FLEXERIL) 10 MG tablet TAKE 1 TABLET BY MOUTH EVERY DAY IN THE EVENING 30 tablet 2    ibuprofen (ADVIL,MOTRIN) 200 MG tablet Take 400 mg by mouth every 6 (six) hours as needed for Pain.      levocetirizine (XYZAL) 5 MG tablet TAKE 1 TABLET (5 MG TOTAL) BY MOUTH  EVERY EVENING. 30 tablet 5    magnesium oxide (MAG-OXIDE) 400 mg tablet Take by mouth. 1 Tablet Oral Every day      mirtazapine (REMERON) 15 MG tablet Take 1 tablet (15 mg total) by mouth every evening. 90 tablet 1    multivit-iron-min-folic acid (MULTIVITAMIN-IRON-MINERALS-FOLIC ACID) 3,500-18-0.4 unit-mg-mg Chew Take by mouth.        potassium 99 mg Tab Take 99 mg by mouth once daily.       pravastatin (PRAVACHOL) 40 MG tablet Take 40 mg by mouth nightly. 1 Tablet Oral Every day      ranitidine (ZANTAC) 300 MG capsule TAKE ONE CAPSULE BY MOUTH IN THE EVENING 30 capsule 0    sertraline (ZOLOFT) 100 MG tablet Take 2 tablets (200 mg total) by mouth every evening. 180 tablet 1    tiZANidine 4 mg Cap TAKE 1 CAPSULE BY MOUTH EVERY EVENING. 90 capsule 1    nicotine (NICODERM CQ) 21 mg/24 hr Place 1 patch onto the skin every 24 hours.      RABEprazole (ACIPHEX) 20 mg tablet TAKE 1 TABLET BY MOUTH EVERY DAY 30 tablet 11     No current facility-administered medications for this visit.        Review of patient's allergies indicates:   Allergen Reactions    Doxycycline hyclate Nausea And Vomiting    Naproxen Hives    Penicillins Swelling     angioedema    Zithromax [azithromycin] Diarrhea    Inderal [propranolol] Other (See Comments)     Tachycardia/anxiety    Minipress [prazosin] Nausea And Vomiting and Other (See Comments)     Headache/anxiety       Family History   Problem Relation Age of Onset    Hypertension Mother     Ulcers Mother     COPD Father     Diabetes Father     Heart disease Father     Kidney disease Father     Cancer Maternal Grandmother         colon cancer    Colon cancer Maternal Grandmother     Cancer Paternal Grandmother         breast cancer    Breast cancer Paternal Grandmother     Crohn's disease Neg Hx     Ulcerative colitis Neg Hx     Allergic rhinitis Neg Hx     Allergies Neg Hx     Angioedema Neg Hx     Asthma Neg Hx     Atopy Neg Hx     Eczema Neg Hx      Immunodeficiency Neg Hx     Rhinitis Neg Hx     Urticaria Neg Hx        Social History     Socioeconomic History    Marital status:      Spouse name: Not on file    Number of children: Not on file    Years of education: Not on file    Highest education level: Not on file   Occupational History    Not on file   Social Needs    Financial resource strain: Not on file    Food insecurity:     Worry: Not on file     Inability: Not on file    Transportation needs:     Medical: Not on file     Non-medical: Not on file   Tobacco Use    Smoking status: Former Smoker     Packs/day: 0.25     Years: 25.00     Pack years: 6.25     Types: Cigarettes     Start date: 2014     Last attempt to quit: 3/8/2017     Years since quittin.5    Smokeless tobacco: Never Used   Substance and Sexual Activity    Alcohol use: No    Drug use: No    Sexual activity: Yes     Partners: Male   Lifestyle    Physical activity:     Days per week: Not on file     Minutes per session: Not on file    Stress: Not on file   Relationships    Social connections:     Talks on phone: Not on file     Gets together: Not on file     Attends [employer] service: Not on file     Active member of club or organization: Not on file     Attends meetings of clubs or organizations: Not on file     Relationship status: Not on file   Other Topics Concern    Not on file   Social History Narrative    Not on file       OB History    Para Term  AB Living   2 2 2         SAB TAB Ectopic Multiple Live Births           2      # Outcome Date GA Lbr Lucius/2nd Weight Sex Delivery Anes PTL Lv   2 Term      CS-LTranv      1 Term      Vag-Spont          Review of Symptoms:  GENERAL: Denies weight gain or weight loss. Feeling well overall.   SKIN: Denies rash or lesions.   HEAD: Denies head injury or headache.   NODES: Denies enlarged lymph nodes.   CHEST: Denies chest pain or shortness of breath.   CARDIOVASCULAR: Denies palpitations or left  "sided chest pain.   ABDOMEN: No abdominal pain, constipation, diarrhea, nausea, vomiting or rectal bleeding.   URINARY: No frequency, dysuria, hematuria, or burning on urination.  HEMATOLOGIC: No easy bruisability or excessive bleeding.   MUSCULOSKELETAL: Denies joint pain or swelling.     /80   Ht 5' 3" (1.6 m)   Wt 71.1 kg (156 lb 12 oz)   LMP  (LMP Unknown)   Physical Exam:  APPEARANCE: Well nourished, well developed, in no acute distress.  SKIN: Normal skin turgor, no lesions.  NECK: Neck symmetric without masses   RESPIRATORY: Normal respiratory effort with no retractions or use of accessory muscles  CARDIOVASCULAR: Peripheral vascular system with no swelling no varicosities and palpation of pulses normal  LYMPHATIC: No enlargements of the lymph nodes noted in the neck, axillae, or groin  ABDOMEN: Soft. No tenderness or masses. No hepatosplenomegaly. No hernias.  BREASTS: Symmetrical, no skin changes or visible lesions. No palpable masses, nipple discharge or adenopathy bilaterally.EXTREMITIES: No clubbing cyanosis or edema.    ASSESSMENT/PLAN:  Encounter for gynecological examination without abnormal finding    Visit for screening mammogram  -     Mammo Digital Screening Bilat With CAD; Future; Expected date: 09/10/2019    Menopause          Patient was counseled today on Pelvic exams and Pap Smear guidelines.   We discussed STD screening if at high risk for a STD.  We discussed recommendation for breast cancer screening with mammogram every other year after the age of 40 and annually after the age of 50.    We discussed colon cancer screening when indicated.   Osteoporosis screening discussed when indicated.   She was advised to see her primary care physician for all other health maintenance.     FOLLOW-UP with me for next routine visit.   " Patient/Caregiver requests family/friend to interpret.

## 2024-10-21 NOTE — CONSULT NOTE ADULT - SUBJECTIVE AND OBJECTIVE BOX
PCP: Dr Poon     CHIEF COMPLAINT: left hip pain     HISTORY OF THE PRESENT ILLNESS:     69 yo woman with history of left hip fracture in 2022- she had surgery at that time. She does continue to have left hip pain which has worsened. She follows with Orthopedic surgeon and was told there was some issue with the prosthetic in place. She is now s/p Revision of left total hip arthroplast with Dr. Figueroa   Pt was seen in PACU, in NAD. Denies SOB, CP. Hospitalist consulted for post op medical management.     PAST MEDICAL & SURGICAL HISTORY:  History of fracture of left ankle  Left hip pain  H/O fracture of left hip  H/O headache  History of hemiarthroplasty of left hip  History of ankle surgery  H/O colonoscopy  History of esophagogastroduodenoscopy (EGD)    FAMILY HISTORY:  No pertinent family history in first degree relatives    Social History: denies smoking  drinks on occasions  denies substance or drug use     Allergies  penicillin (Rash)  Intolerances      Home Medications:  Ibuprofen prn:  (21 Oct 2024 06:31)  Magnesium supplement:  (21 Oct 2024 06:31)    Vital Signs Last 24 Hrs  T(C): 36.1 (21 Oct 2024 12:01), Max: 36.6 (21 Oct 2024 06:31)  T(F): 97 (21 Oct 2024 12:01), Max: 97.9 (21 Oct 2024 06:31)  HR: 64 (21 Oct 2024 12:30) (58 - 68)  BP: 91/72 (21 Oct 2024 12:30) (91/72 - 133/81)  BP(mean): --  RR: 17 (21 Oct 2024 12:30) (12 - 18)  SpO2: 100% (21 Oct 2024 12:30) (96% - 100%)    Parameters below as of 21 Oct 2024 12:01  Patient On (Oxygen Delivery Method): nasal cannula  O2 Flow (L/min): 2      REVIEW OF SYSTEMS:   All 10 systems reviewed in detailed and found to be negative with the exception of what has already been described above    MEDICATIONS  (STANDING):  influenza  Vaccine (HIGH DOSE) 0.5 milliLiter(s) IntraMuscular once  lactated ringers. 1000 milliLiter(s) (125 mL/Hr) IV Continuous <Continuous>  tranexamic acid IVPB 1000 milliGRAM(s) IV Intermittent once  tranexamic acid IVPB 1000 milliGRAM(s) IV Intermittent once    MEDICATIONS  (PRN):  fentaNYL    Injectable 50 MICROGram(s) IV Push every 10 minutes PRN Severe Pain (7 - 10)  HYDROmorphone  Injectable 0.5 milliGRAM(s) IV Push every 10 minutes PRN Moderate Pain (4 - 6)  ondansetron Injectable 4 milliGRAM(s) IV Push once PRN Nausea and/or Vomiting  oxyCODONE    IR 5 milliGRAM(s) Oral once PRN Moderate Pain (4 - 6)    PE:  Constitutional: NAD, laying in bed  HEENT: NC/AT  Back: no tenderness  Respiratory: respirations even and non labored, LCTA  Cardiovascular: S1S2 regular, no murmurs  Abdomen: soft, not tender, not distended, positive BS  Genitourinary: voiding  Musculoskeletal: no muscle tenderness, no joint swelling or tenderness  Extremities: no pedal edema - left hip dressing in place.  Neurological: no focal deficits    LABS: post op labs- pending

## 2024-10-21 NOTE — DISCHARGE NOTE PROVIDER - NSDCHHNEEDSERVICE_GEN_ALL_CORE
Teaching and training/Wound care and assessment Rehabilitation services/Teaching and training/Wound care and assessment

## 2024-10-21 NOTE — INPATIENT CERTIFICATION FOR MEDICARE PATIENTS - PHYSICIAN CONCUR
18-Jul-2024 13:37 I concur with the Admission Order and I certify that services are provided in accordance with Section 42 CFR § 412.3

## 2024-10-21 NOTE — DISCHARGE NOTE PROVIDER - NSDCMRMEDTOKEN_GEN_ALL_CORE_FT
Ibuprofen prn:   Magnesium supplement:    aspirin 81 mg oral delayed release tablet: 1 tab(s) orally 2 times a day  CeleBREX 200 mg oral capsule: 1 cap(s) orally 2 times a day Please take two hours before aspirin  Ibuprofen prn:   Magnesium supplement:   MiraLax oral powder for reconstitution: 17 gram(s) orally once a day as needed for  constipation  oxyCODONE 5 mg oral tablet: 1 tab(s) orally every 6 hours MDD: 4 tablets  Protonix 40 mg oral delayed release tablet: 1 tab(s) orally once a day  Senna 8.6 mg oral tablet: 1 tab(s) orally once a day as needed for  constipation  Tylenol Extra Strength 500 mg oral tablet: 2 tab(s) orally 3 times a day   aspirin 81 mg oral delayed release tablet: 1 tab(s) orally 2 times a day  cefadroxil 500 mg oral capsule: 1 cap(s) orally 2 times a day Take two times per day for one week  CeleBREX 200 mg oral capsule: 1 cap(s) orally 2 times a day Please take two hours before aspirin  Ibuprofen prn:   Magnesium supplement:   MiraLax oral powder for reconstitution: 17 gram(s) orally once a day as needed for  constipation  oxyCODONE 5 mg oral tablet: 1 tab(s) orally every 6 hours MDD: 4 tablets  Protonix 40 mg oral delayed release tablet: 1 tab(s) orally once a day  Senna 8.6 mg oral tablet: 1 tab(s) orally once a day as needed for  constipation  Tylenol Extra Strength 500 mg oral tablet: 2 tab(s) orally 3 times a day   aspirin 81 mg oral delayed release tablet: 1 tab(s) orally 2 times a day  cefadroxil 500 mg oral capsule: 1 cap(s) orally 2 times a day Take two times per day for one week  CeleBREX 200 mg oral capsule: 1 cap(s) orally 2 times a day Please take two hours before aspirin  MiraLax oral powder for reconstitution: 17 gram(s) orally once a day as needed for  constipation  oxyCODONE 5 mg oral tablet: 1 tab(s) orally every 6 hours MDD: 4 tablets  Protonix 40 mg oral delayed release tablet: 1 tab(s) orally once a day  Senna 8.6 mg oral tablet: 1 tab(s) orally once a day as needed for  constipation  Tylenol Extra Strength 500 mg oral tablet: 2 tab(s) orally 3 times a day

## 2024-10-21 NOTE — CONSULT NOTE ADULT - NS ATTEND AMEND GEN_ALL_CORE FT
Chart and meds reviewed.  Case d/w TOMAS Schaefer.    68 F with Hx of left hip fracture, s/p surgical repair in 2022, now presents with progressive left hip pain and was admitted for elective revision of prior left hip hemiarthoplasty with Dr. Figueroa.    Plan:  -  pain control  -  incentive spirometry  -  fall precautions  -  hip precautions  -  PT as tolerated  -  neurovascular checks  -  check labs in am  -  wound care per ortho  -  IVFs  -  complete perioperative ABXs  -  extended antibiotic prophylaxis with PO ABXs per surgery  -  replete potassium  -  DVT prophylaxis    d/w TOMAS Schaefer

## 2024-10-21 NOTE — CONSULT NOTE ADULT - ASSESSMENT
IMPRESSION:      69 yo woman s/p left hemiarthroplasty in 2022- now s/p revision to left CHARLES       *history of left hip fracture s/p left hemiarthroplasty in 2022- now s/p revision to left CHARLES   POD#0  EBL~250ml   monitor VS   pain regimen PRN  PT eval  Bowel regimen  IVF hydration   C/W prophylactic ABT - on cefradoxil extended antibiotic course   diet as tolerated   PPI  VTE prophylaxis  monitor CBC/BMP  monitor PO/supplemental O2 as needed  encourage IS      * VTE prophylaxis  Venodynes  INC mobility  CAPRINI score - 8  aspirin BID 81mg      Thank you for the consult, will follow.

## 2024-10-21 NOTE — PATIENT PROFILE ADULT - FALL HARM RISK - UNIVERSAL INTERVENTIONS
Bed in lowest position, wheels locked, appropriate side rails in place/Call bell, personal items and telephone in reach/Instruct patient to call for assistance before getting out of bed or chair/Non-slip footwear when patient is out of bed/[place] to call system/Physically safe environment - no spills, clutter or unnecessary equipment/Purposeful Proactive Rounding/Room/bathroom lighting operational, light cord in reach

## 2024-10-21 NOTE — DISCHARGE NOTE PROVIDER - NSDCCPTREATMENT_GEN_ALL_CORE_FT
PRINCIPAL PROCEDURE  Procedure: Total left hip arthroplasty  Findings and Treatment: w/ Removal of Hardware

## 2024-10-21 NOTE — PROGRESS NOTE ADULT - SUBJECTIVE AND OBJECTIVE BOX
Postop Check    Patient tolerated the procedure well. Patient seen and examined at bedside.  No acute complaints at this time. Pain well controlled. Denies chest pain, shortness of breath, nausea or vomiting.     PE:  Vital Signs Last 24 Hrs  T(C): 36.1 (10-21-24 @ 12:01), Max: 36.6 (10-21-24 @ 06:31)  T(F): 97 (10-21-24 @ 12:01), Max: 97.9 (10-21-24 @ 06:31)  HR: 66 (10-21-24 @ 12:45) (58 - 68)  BP: 98/58 (10-21-24 @ 12:45) (91/72 - 133/81)  BP(mean): --  RR: 18 (10-21-24 @ 12:45) (12 - 18)  SpO2: 95% (10-21-24 @ 12:45) (95% - 100%)    General: NAD, resting comfortably in bed  LLE:   Dressing in place C/D/I  SCD in place bilaterally  No calf tenderness   Motor: + EHL/FHL/TA/GSC  Sensory: SILT SPN/DPN/Maged/Saph/Tib  DP/PT 2+      A/P:  68y f s/p L Hip DAVID and CHARLES POD 0  -Posterior precautions  -PT/OT  -WBAT on the LLE  -Pain Control  -DVT ppx: SCDs, plan for A81 starting POD1  -Continue perioperative abx x 24 hours  -Duracef protocol - 500mg Cefadroxil x7 days  -FU AM Labs  -Rest, ice, compress, and elevate the extremity as tolerated  -Incentive Spirometry  -Medical management appreciated  -Dispo per PT  -D/w Dr. Figueroa, will update plan as needed

## 2024-10-22 ENCOUNTER — TRANSCRIPTION ENCOUNTER (OUTPATIENT)
Age: 69
End: 2024-10-22

## 2024-10-22 LAB
ANION GAP SERPL CALC-SCNC: 4 MMOL/L — LOW (ref 5–17)
BUN SERPL-MCNC: 17 MG/DL — SIGNIFICANT CHANGE UP (ref 7–23)
CALCIUM SERPL-MCNC: 9 MG/DL — SIGNIFICANT CHANGE UP (ref 8.5–10.1)
CHLORIDE SERPL-SCNC: 110 MMOL/L — HIGH (ref 96–108)
CO2 SERPL-SCNC: 25 MMOL/L — SIGNIFICANT CHANGE UP (ref 22–31)
CREAT SERPL-MCNC: 0.73 MG/DL — SIGNIFICANT CHANGE UP (ref 0.5–1.3)
EGFR: 88 ML/MIN/1.73M2 — SIGNIFICANT CHANGE UP
GLUCOSE SERPL-MCNC: 123 MG/DL — HIGH (ref 70–99)
HCT VFR BLD CALC: 29.1 % — LOW (ref 34.5–45)
HGB BLD-MCNC: 9.6 G/DL — LOW (ref 11.5–15.5)
MCHC RBC-ENTMCNC: 30.9 PG — SIGNIFICANT CHANGE UP (ref 27–34)
MCHC RBC-ENTMCNC: 33 GM/DL — SIGNIFICANT CHANGE UP (ref 32–36)
MCV RBC AUTO: 93.6 FL — SIGNIFICANT CHANGE UP (ref 80–100)
PLATELET # BLD AUTO: 203 K/UL — SIGNIFICANT CHANGE UP (ref 150–400)
POTASSIUM SERPL-MCNC: 4.4 MMOL/L — SIGNIFICANT CHANGE UP (ref 3.5–5.3)
POTASSIUM SERPL-SCNC: 4.4 MMOL/L — SIGNIFICANT CHANGE UP (ref 3.5–5.3)
RBC # BLD: 3.11 M/UL — LOW (ref 3.8–5.2)
RBC # FLD: 13.1 % — SIGNIFICANT CHANGE UP (ref 10.3–14.5)
SODIUM SERPL-SCNC: 139 MMOL/L — SIGNIFICANT CHANGE UP (ref 135–145)
SURGICAL PATHOLOGY STUDY: SIGNIFICANT CHANGE UP
WBC # BLD: 12.8 K/UL — HIGH (ref 3.8–10.5)
WBC # FLD AUTO: 12.8 K/UL — HIGH (ref 3.8–10.5)

## 2024-10-22 PROCEDURE — 99232 SBSQ HOSP IP/OBS MODERATE 35: CPT

## 2024-10-22 RX ADMIN — Medication 1000 MILLIGRAM(S): at 20:25

## 2024-10-22 RX ADMIN — OXYCODONE HYDROCHLORIDE 10 MILLIGRAM(S): 30 TABLET ORAL at 01:10

## 2024-10-22 RX ADMIN — Medication 200 MILLIGRAM(S): at 11:41

## 2024-10-22 RX ADMIN — Medication 200 MILLIGRAM(S): at 21:32

## 2024-10-22 RX ADMIN — Medication 1000 MILLIGRAM(S): at 11:41

## 2024-10-22 RX ADMIN — CEFADROXIL 500 MILLIGRAM(S): 250 POWDER, FOR SUSPENSION ORAL at 11:41

## 2024-10-22 RX ADMIN — Medication 1 TABLET(S): at 05:30

## 2024-10-22 RX ADMIN — Medication 1 TABLET(S): at 11:41

## 2024-10-22 RX ADMIN — DEXAMETHASONE 1.5 MG 101.6 MILLIGRAM(S): 1.5 TABLET ORAL at 06:08

## 2024-10-22 RX ADMIN — Medication 81 MILLIGRAM(S): at 18:17

## 2024-10-22 RX ADMIN — CEFADROXIL 500 MILLIGRAM(S): 250 POWDER, FOR SUSPENSION ORAL at 21:32

## 2024-10-22 RX ADMIN — Medication 1 TABLET(S): at 21:32

## 2024-10-22 RX ADMIN — Medication 1 TABLET(S): at 15:03

## 2024-10-22 RX ADMIN — Medication 500 MILLILITER(S): at 06:31

## 2024-10-22 RX ADMIN — OXYCODONE HYDROCHLORIDE 10 MILLIGRAM(S): 30 TABLET ORAL at 01:40

## 2024-10-22 RX ADMIN — Medication 1000 MILLIGRAM(S): at 03:22

## 2024-10-22 RX ADMIN — PANTOPRAZOLE SODIUM 40 MILLIGRAM(S): 40 TABLET, DELAYED RELEASE ORAL at 05:30

## 2024-10-22 RX ADMIN — Medication 400 MILLIGRAM(S): at 03:19

## 2024-10-22 NOTE — OCCUPATIONAL THERAPY INITIAL EVALUATION ADULT - SHORT TERM MEMORY, REHAB EVAL
Unaware of posterior hip precautions prior to session, recalls 3/3 precautions by end of session, handout provided to promote carryover/intact

## 2024-10-22 NOTE — DIETITIAN INITIAL EVALUATION ADULT - NSFNSGIASSESSMENTFT_GEN_A_CORE
No BM documented in chart. Pt reports last BM was 2 days ago.  No BM documented in chart. Pt reports last BM was 2 days ago.   on multiple bowel regimens (senna, miralax)

## 2024-10-22 NOTE — OCCUPATIONAL THERAPY INITIAL EVALUATION ADULT - BED MOBILITY LIMITATIONS, REHAB EVAL
Dylan Oconnell decreased ability to use arms for pushing/pulling/decreased ability to use legs for bridging/pushing/impaired ability to control trunk for mobility

## 2024-10-22 NOTE — DIETITIAN INITIAL EVALUATION ADULT - PERTINENT LABORATORY DATA
10-22    139  |  110[H]  |  17  ----------------------------<  123[H]  4.4   |  25  |  0.73    Ca    9.0      22 Oct 2024 08:28    A1C with Estimated Average Glucose Result: 5.5 % (10-09-24 @ 11:14)    Chloride high 2/2 surgery   Glucose high 2/2 inflammation post op, but downtrending  (from 144 on 10/21) 10-22    139  |  110[H]  |  17  ----------------------------<  123[H]  4.4   |  25  |  0.73    Ca    9.0      22 Oct 2024 08:28    A1C with Estimated Average Glucose Result: 5.5 % (10-09-24 @ 11:14)    Chloride high 2/2 possible dehydration    Glucose high 2/2 inflammation post op, but now downtrending  (from 144 on 10/21)

## 2024-10-22 NOTE — PHYSICAL THERAPY INITIAL EVALUATION ADULT - PERTINENT HX OF CURRENT PROBLEM, REHAB EVAL
Pt is a 68y old female POD1 DAVID, conversion of left dago to total hip arthroplasty. PMH listed below.

## 2024-10-22 NOTE — OCCUPATIONAL THERAPY INITIAL EVALUATION ADULT - LEVEL OF INDEPENDENCE:TOILET, OT EVAL
found on bedpan, educated on commode, later transferred on commode/dependent (less than 25% patients effort)

## 2024-10-22 NOTE — DIETITIAN INITIAL EVALUATION ADULT - ETIOLOGY
r/t decreased ability to meet increase nutrient needs 2/2 surgery  r/t increased physiological demand for protein

## 2024-10-22 NOTE — OCCUPATIONAL THERAPY INITIAL EVALUATION ADULT - PERTINENT HX OF CURRENT PROBLEM, REHAB EVAL
69 yo woman with history of left hip fracture in 2022- she had surgery at that time. She does continue to have left hip pain which has worsened. She follows with Orthopedic surgeon and was told there was some issue with the prosthetic in place. She is now s/p Revision of left total hip arthroplast with Dr. Figueroa

## 2024-10-22 NOTE — PROGRESS NOTE ADULT - SUBJECTIVE AND OBJECTIVE BOX
Pt seen at bedside this AM. No acute complaints, pain is well managed. Denies numbness, tingling, fevers, chills, CP, SOB, N/V/D.    Vital Signs (24 Hrs):  T(C): 36.4 (10-22-24 @ 00:29), Max: 36.6 (10-21-24 @ 06:31)  HR: 74 (10-22-24 @ 00:29) (58 - 74)  BP: 101/47 (10-22-24 @ 00:29) (91/72 - 133/81)  RR: 16 (10-22-24 @ 00:29) (12 - 18)  SpO2: 96% (10-22-24 @ 00:29) (93% - 100%)  Wt(kg): --    LABS:                          11.2   12.78 )-----------( 215      ( 21 Oct 2024 12:52 )             33.8     10-21    138  |  109[H]  |  12  ----------------------------<  144[H]  3.3[L]   |  23  |  0.57    Ca    7.9[L]      21 Oct 2024 12:52    Physical Exam:  General: NAD, pt laying in bed comfortably  SCDs in place BL    LLE:  Dressings C/D/I  Compartments soft, compressible  Calves nontender  Motor: +GSC, TA, EHL, FHL  SILT: SPN, DPN, Tib, Saph, Maged  +DP    A/P: 68F POD1 DAVID, conversion of left dago to total hip arthroplasty    -WBAT, posterior hip precautions  -Pain Control  -DVT ppx: SCDs, plan for A81 starting POD1  -Continue perioperative abx x 24 hours  -Duracef protocol - 500mg Cefadroxil x7 days  -FU AM Labs  -Incentive Spirometry  -Medical management appreciated  -Dispo pending PT eval  -D/w Dr. Figueroa, will update plan as needed

## 2024-10-22 NOTE — OCCUPATIONAL THERAPY INITIAL EVALUATION ADULT - NSACTIVITYREC_GEN_A_OT
Patient educated on post hip  precautions and how to participate in dressing tasks, grooming tasks, toileting tasks, bed mobility and  all functional transfers while abiding by precautions. Handout provided to promote carryover. Pt educated on DME and AE throughout session and how to self purchase- reports having RTS, would benefit from hipkit

## 2024-10-22 NOTE — PHYSICAL THERAPY INITIAL EVALUATION ADULT - RANGE OF MOTION EXAMINATION, REHAB EVAL
(within L hip precautions)/bilateral upper extremity ROM was WFL (within functional limits)/bilateral lower extremity ROM was WFL (within functional limits) RECEIVED CARE OF PATIENT DURING CHANGE OF SHIFT BEDSIDE REPORT.

## 2024-10-22 NOTE — PHYSICAL THERAPY INITIAL EVALUATION ADULT - ADDITIONAL COMMENTS
Pt lives in a house, 2 exterior steps to enter (no HR). Bedroom located on first floor. Bathroom set up is a walk-in shower. Pt owns a raised toilet seat and shower chair.

## 2024-10-22 NOTE — OCCUPATIONAL THERAPY INITIAL EVALUATION ADULT - ADL RETRAINING, OT EVAL
Patient will participate in lower body dressing with MOD I    in 2 weeks  Patient will participate in toilet transfer with MOD I  in 2 weeks  Patient will participate in toileting with   MOD I   in 2 weeks  Patient will participate in grooming standing at the sink with  MOD I  in 2 weeks

## 2024-10-22 NOTE — OCCUPATIONAL THERAPY INITIAL EVALUATION ADULT - LEVEL OF INDEPENDENCE: SIT/STAND, REHAB EVAL
contact guard Quinolones Counseling:  I discussed with the patient the risks of fluoroquinolones including but not limited to GI upset, allergic reaction, drug rash, diarrhea, dizziness, photosensitivity, yeast infections, liver function test abnormalities, tendonitis/tendon rupture.

## 2024-10-22 NOTE — PHYSICAL THERAPY INITIAL EVALUATION ADULT - SHORT TERM MEMORY, REHAB EVAL
Unaware of posterior hip precautions prior to session, recalls 3/3 precautions by end of session, handout provided to promote carryover

## 2024-10-22 NOTE — DIETITIAN INITIAL EVALUATION ADULT - PERTINENT MEDS FT
MEDICATIONS  (STANDING):  acetaminophen     Tablet .. 1000 milliGRAM(s) Oral every 8 hours - can increase glucose   acetaminophen   IVPB .. 1000 milliGRAM(s) IV Intermittent every 8 hours  aspirin enteric coated 81 milliGRAM(s) Oral every 12 hours  calcium carbonate 1250 mG  + Vitamin D (OsCal 500 + D) 1 Tablet(s) Oral three times a day  cefadroxil 500 milliGRAM(s) Oral two times a day  celecoxib 200 milliGRAM(s) Oral every 12 hours - can increase glucose   influenza  Vaccine (HIGH DOSE) 0.5 milliLiter(s) IntraMuscular once  lactated ringers. 1000 milliLiter(s) (100 mL/Hr) IV Continuous <Continuous>  multivitamin 1 Tablet(s) Oral daily  pantoprazole    Tablet 40 milliGRAM(s) Oral before breakfast - can increase glucose   polyethylene glycol 3350 17 Gram(s) Oral at bedtime  senna 2 Tablet(s) Oral at bedtime - can increase glucose  tranexamic acid IVPB 1000 milliGRAM(s) IV Intermittent once  tranexamic acid IVPB 1000 milliGRAM(s) IV Intermittent once    MEDICATIONS  (PRN):  HYDROmorphone  Injectable 0.5 milliGRAM(s) IV Push every 3 hours PRN Breakthrough pain  ondansetron Injectable 4 milliGRAM(s) IV Push every 6 hours PRN Nausea and/or Vomiting  oxyCODONE    IR 10 milliGRAM(s) Oral every 3 hours PRN Severe Pain (7 - 10)  oxyCODONE    IR 5 milliGRAM(s) Oral every 3 hours PRN Moderate Pain (4 - 6)   MEDICATIONS  (STANDING):  acetaminophen     Tablet .. 1000 milliGRAM(s) Oral every 8 hours - can increase glucose and cause diarrhea   acetaminophen   IVPB .. 1000 milliGRAM(s) IV Intermittent every 8 hours  aspirin enteric coated 81 milliGRAM(s) Oral every 12 hours  calcium carbonate 1250 mG  + Vitamin D (OsCal 500 + D) 1 Tablet(s) Oral three times a day - supplements   cefadroxil 500 milliGRAM(s) Oral two times a day  celecoxib 200 milliGRAM(s) Oral every 12 hours - can increase glucose and may cause constipation   influenza  Vaccine (HIGH DOSE) 0.5 milliLiter(s) IntraMuscular once  lactated ringers. 1000 milliLiter(s) (100 mL/Hr) IV Continuous <Continuous>  multivitamin 1 Tablet(s) Oral daily  pantoprazole    Tablet 40 milliGRAM(s) Oral before breakfast - can increase glucose and affect B12 absorption    polyethylene glycol 3350 17 Gram(s) Oral at bedtime - used to treat constipation; however may cause diarrhea/dehydration w/ overuse   senna 2 Tablet(s) Oral at bedtime - can increase glucose and can cause dehydration/diarrhea w/ overuse   tranexamic acid IVPB 1000 milliGRAM(s) IV Intermittent once     MEDICATIONS  (PRN):  HYDROmorphone  Injectable 0.5 milliGRAM(s) IV Push every 3 hours PRN Breakthrough pain - can cause constipation   ondansetron Injectable 4 milliGRAM(s) IV Push every 6 hours PRN Nausea and/or Vomiting   oxyCODONE    IR 10 milliGRAM(s) Oral every 3 hours PRN Severe Pain (7 - 10) - can cause constipation   oxyCODONE    IR 5 milliGRAM(s) Oral every 3 hours PRN Moderate Pain (4 - 6) - can cause constipation

## 2024-10-22 NOTE — DIETITIAN INITIAL EVALUATION ADULT - ENTER FROM (ML/KG)
Hospital Day: 2   MD  DELIVERY ONLY [27346] ( SECTION, WITH TUBAL LIGATION)  MD LIGATION,FALLOPIAN TUBE W/ [30388]    1 Day Post-Op    Patient Active Problem List   Diagnosis    Previous  delivery affecting pregnancy     delivery delivered    Drug use affecting pregnancy in third trimester    2. Obesity affecting pregnancy in third trimester    1. Umbilical cord cyst during pregnancy, antepartum    Fetal ascites causing disproportion    Encounter for suspected problem with amniotic cavity and membrane ruled out         Khris reports that she is feeling well postoperatively. She is tolerating feedings. She denies nausea, vomiting. She is passing flatus.  She reports that pain is well controlled with pain meds. Duarte has been discontinued. Lochia is minimal.    Vitals:    11/15/22 0432   BP: (!) 105/52   Pulse: 81   Resp: 18   Temp: 97.9 °F (36.6 °C)         Temp Range:  Temp:  [97.8 °F (36.6 °C)-98.5 °F (36.9 °C)]      Physical Exam:   Chest: Clear to auscultation   Cardiovascular: Regular rate and Rhythm. No tachycardia   Abd: soft, bowel sounds active, non-tender    Incision: Healing well. No erythema, significant drainage. Sutures intact.   Ext: no cyanosis, clubbing, edema    I/O last 3 completed shifts:  In: 1900 [I.V.:1900]  Out: 1360 [Urine:1110; Blood:250]  No intake/output data recorded.      Recent Lab:    WBC   Date Value Ref Range Status   11/15/2022 13.78 (H) 3.90 - 12.70 K/uL Final     Hemoglobin   Date Value Ref Range Status   11/15/2022 10.6 (L) 12.0 - 16.0 g/dL Final     Hematocrit   Date Value Ref Range Status   11/15/2022 33.3 (L) 37.0 - 48.5 % Final       Group & Rh   Date Value Ref Range Status   2022 AB POS  Final       Immunization History   Administered Date(s) Administered    MMR 2020       Information for the patient's :  Lawrence Sheikh [[id number]]   No results found for this or any previous visit.       Assessment: POD#1, s/p  repeat LTCS     Plan: Activity: ad carlos eduardo   Diet: General/Regular   Medications: current medication regimen unchanged.       Cindy Garnica MD, FACOG   25

## 2024-10-22 NOTE — OCCUPATIONAL THERAPY INITIAL EVALUATION ADULT - GENERAL OBSERVATIONS, REHAB EVAL
Pt received semi-supine in bed, vss, nad. Pt left seated in chair with chair alarm on and abduction wedge donned

## 2024-10-22 NOTE — DIETITIAN INITIAL EVALUATION ADULT - ADD RECOMMEND
1. Encourage protein rich foods, maximize food preferences. Will add Ensure Max shake daily to optimize nutritional needs (provides 150 kcal, 30g protein/shake)  2. Monitor bowel movements, if no BM for >3 days, consider increasing bowel regimen   3. MVI w/minerals daily to ensure 100% RDA   4. Obtain weekly wt to track/trend changes  5. Confirm goals of care regarding nutrition support   6. Monitor daily lytes/min and replete prn. Consider obtaining vitamin D 25OH level to assess nutriture.  1. Encourage protein rich foods, maximize food preferences. Will add Ensure Max shake daily to optimize nutritional needs (provides 150 kcal, 30g protein/shake)  2. Monitor bowel movements, if no BM for >3 days, consider increasing bowel regimen   3. MVI w/minerals daily to ensure 100% RDA   4. Obtain weekly wt to track/trend changes  5. Confirm goals of care regarding nutrition support   6. Monitor daily lytes/min and replete prn. Consider obtaining vitamin D 25OH level to assess nutriture.   RD will continue to monitor PO intake, labs, hydration, and wt prn.

## 2024-10-22 NOTE — DIETITIAN INITIAL EVALUATION ADULT - OTHER INFO
69 y/o F with hx of left hip fracture in 2022- had surgery at that time. Continues to have L hip pain which has worsened. Outpatient ortho endorsed issue with the prosthetic in place. She is now s/p revision of L total hip arthroplast.    Currently on regular diet order. Pt endorses eating most meals since admission. Breakfast today was yogurt, not witnessed by dietetic intern. Reports UBW is between 175-180#; unable to obtain bed scale wt as pt was sitting in chair. Current EMR wt doc'd at 180#, however mild-mod edema doc'd which may be skewing current wt. appearance; no clinically significant wt. changes at this time. Dietetic intern and RD provided verbal education about the importance of protein-rich foods to help promote fracture healing which pt receptive to. Will add Ensure Max to optimize needs-pt receptive.    67 y/o F with hx of left hip fracture in 2022- had surgery at that time. Continues to have L hip pain which has worsened. Outpatient ortho endorsed issue with the prosthetic in place. She is now s/p revision of L total hip arthroplast.    Currently on regular diet order. Pt endorses eating most meals since admission. Breakfast today was yogurt, not witnessed by dietetic intern. Reports UBW is between 175-180#; unable to obtain bed scale wt as pt was sitting in chair. Current EMR wt doc'd at 180#, however mild-mod edema doc'd which may be skewing current wt. appearance; no clinically significant wt. changes at this time. Dietetic intern and RD provided verbal education about the importance of protein-rich foods to help promote fracture healing which pt receptive to. Will add Ensure Max to optimize needs-pt receptive. See below for further recommendations.    69 y/o F with hx of left hip fracture in 2022- had surgery at that time. Continues to have L hip pain which has worsened. Outpatient ortho endorsed issue with the prosthetic in place. She is now s/p revision of L total hip arthroplasty on 10/21/24.    Currently on regular diet order. Pt endorses eating most meals since admission. Reports breakfast today was only a yogurt, tray not observed by dietetic intern at bed side as visited pt later in the day. Reports UBW is between 175-180#; unable to obtain bed scale wt as pt was sitting in chair. Current EMR wt doc'd at 180#, however mild-mod edema doc'd which may be skewing current wt. appearance; no clinically significant wt. changes at this time. Dietetic intern provided verbal education about the importance of protein-rich foods to help promote fracture healing which pt receptive to. Will add Ensure Max to optimize needs-pt receptive. See below for further recommendations.

## 2024-10-22 NOTE — PHYSICAL THERAPY INITIAL EVALUATION ADULT - FOLLOWS COMMANDS/ANSWERS QUESTIONS, REHAB EVAL
Israeli speaking primarily;  listed in flowsheet/100% of the time/able to follow single-step instructions

## 2024-10-22 NOTE — PHYSICAL THERAPY INITIAL EVALUATION ADULT - GENERAL OBSERVATIONS, REHAB EVAL
Pt rec'd semi-supine in bed, +hip ABduction pillow, +RA, +PIV, in NAD. RN cleared pt for PT eval. Co-tx with OT.

## 2024-10-22 NOTE — DISCHARGE NOTE NURSING/CASE MANAGEMENT/SOCIAL WORK - PATIENT PORTAL LINK FT
You can access the FollowMyHealth Patient Portal offered by [facility] by registering at the following website: [email or web address]. By joining [facility]’s FollowMyHealth portal, you will also be able to view your health information using other applications (apps) compatible with our system.

## 2024-10-22 NOTE — DIETITIAN INITIAL EVALUATION ADULT - NSFNSGIIOFT_GEN_A_CORE
I&O's Detail    21 Oct 2024 07:01  -  22 Oct 2024 07:00  --------------------------------------------------------  IN:    IV PiggyBack: 200 mL    Lactated Ringers: 70 mL    Lactated Ringers: 1200 mL    Lactated Ringers Bolus: 500 mL    Other (mL): 2500 mL  Total IN: 4470 mL    OUT:    Oral Fluid: 0 mL    Other (mL): 800 mL    Voided (mL): 400 mL  Total OUT: 1200 mL    Total NET: 3270 mL

## 2024-10-22 NOTE — DISCHARGE NOTE NURSING/CASE MANAGEMENT/SOCIAL WORK - FINANCIAL ASSISTANCE
[facility] provides services at a reduced cost to those who are determined to be eligible through [facility]’s financial assistance program. Information regarding [facility]’s financial assistance program can be found by going to [email or web address] or by calling [phone].

## 2024-10-22 NOTE — PROGRESS NOTE ADULT - SUBJECTIVE AND OBJECTIVE BOX
CHIEF COMPLAINT: left hip pain     HISTORY OF THE PRESENT ILLNESS:     67 yo woman with history of left hip fracture in 2022- she had surgery at that time. She does continue to have left hip pain which has worsened. She follows with Orthopedic surgeon and was told there was some issue with the prosthetic in place. She is now s/p Revision of left total hip arthroplast with Dr. Figueroa       10/22- Patient was seen and examined. VSS. No acute overnight events. Denies fever, pain or SOB. Tolerating diet. worked with PT- OOB to chair     ROS:   All 10 systems reviewed and found to be negative with the exception of what has been described above.     Vital Signs Last 24 Hrs  T(C): 36.7 (22 Oct 2024 08:00), Max: 36.7 (22 Oct 2024 08:00)  T(F): 98 (22 Oct 2024 08:00), Max: 98 (22 Oct 2024 08:00)  HR: 64 (22 Oct 2024 08:00) (58 - 74)  BP: 97/49 (22 Oct 2024 08:00) (91/72 - 105/60)  BP(mean): --  RR: 16 (22 Oct 2024 08:00) (12 - 18)  SpO2: 97% (22 Oct 2024 08:00) (93% - 100%)    Parameters below as of 22 Oct 2024 08:00  Patient On (Oxygen Delivery Method): room air    PE:  Constitutional: NAD, laying in bed  HEENT: NC/AT  Back: no tenderness  Respiratory: respirations even and non labored, LCTA  Cardiovascular: S1S2 regular, no murmurs  Abdomen: soft, not tender, not distended, positive BS  Genitourinary: voiding  Musculoskeletal: no muscle tenderness, no joint swelling or tenderness  Extremities: no pedal edema - left hip dressing in place.  Neurological: no focal deficits    MEDICATIONS  (STANDING):  acetaminophen     Tablet .. 1000 milliGRAM(s) Oral every 8 hours  acetaminophen   IVPB .. 1000 milliGRAM(s) IV Intermittent every 8 hours  aspirin enteric coated 81 milliGRAM(s) Oral every 12 hours  calcium carbonate 1250 mG  + Vitamin D (OsCal 500 + D) 1 Tablet(s) Oral three times a day  cefadroxil 500 milliGRAM(s) Oral two times a day  celecoxib 200 milliGRAM(s) Oral every 12 hours  influenza  Vaccine (HIGH DOSE) 0.5 milliLiter(s) IntraMuscular once  lactated ringers. 1000 milliLiter(s) (100 mL/Hr) IV Continuous <Continuous>  multivitamin 1 Tablet(s) Oral daily  pantoprazole    Tablet 40 milliGRAM(s) Oral before breakfast  polyethylene glycol 3350 17 Gram(s) Oral at bedtime  senna 2 Tablet(s) Oral at bedtime  tranexamic acid IVPB 1000 milliGRAM(s) IV Intermittent once  tranexamic acid IVPB 1000 milliGRAM(s) IV Intermittent once    MEDICATIONS  (PRN):  HYDROmorphone  Injectable 0.5 milliGRAM(s) IV Push every 3 hours PRN Breakthrough pain  ondansetron Injectable 4 milliGRAM(s) IV Push every 6 hours PRN Nausea and/or Vomiting  oxyCODONE    IR 10 milliGRAM(s) Oral every 3 hours PRN Severe Pain (7 - 10)  oxyCODONE    IR 5 milliGRAM(s) Oral every 3 hours PRN Moderate Pain (4 - 6)    10-22    139  |  110[H]  |  17  ----------------------------<  123[H]  4.4   |  25  |  0.73    Ca    9.0      22 Oct 2024 08:28                          9.6    12.80 )-----------( 203      ( 22 Oct 2024 08:28 )             29.1                        CHIEF COMPLAINT: left hip pain     HISTORY OF THE PRESENT ILLNESS:     67 yo woman with history of left hip fracture in 2022- she had surgery at that time. She does continue to have left hip pain which has worsened. She follows with Orthopedic surgeon and was told there was some issue with the prosthetic in place. She is now s/p Revision of left total hip arthroplast with Dr. Figueroa       10/22- Patient was seen and examined. VSS. No acute overnight events. Denies fever, pain or SOB. Tolerating diet. worked with PT- OOB to chair - this morning BP was on the low side and patient received IVF bolus- will continue to monitor BP throughout the day.     ROS:   All 10 systems reviewed and found to be negative with the exception of what has been described above.     Vital Signs Last 24 Hrs  T(C): 36.7 (22 Oct 2024 08:00), Max: 36.7 (22 Oct 2024 08:00)  T(F): 98 (22 Oct 2024 08:00), Max: 98 (22 Oct 2024 08:00)  HR: 64 (22 Oct 2024 08:00) (58 - 74)  BP: 97/49 (22 Oct 2024 08:00) (91/72 - 105/60)  BP(mean): --  RR: 16 (22 Oct 2024 08:00) (12 - 18)  SpO2: 97% (22 Oct 2024 08:00) (93% - 100%)    Parameters below as of 22 Oct 2024 08:00  Patient On (Oxygen Delivery Method): room air    PE:  Constitutional: NAD, laying in bed  HEENT: NC/AT  Back: no tenderness  Respiratory: respirations even and non labored, LCTA  Cardiovascular: S1S2 regular, no murmurs  Abdomen: soft, not tender, not distended, positive BS  Genitourinary: voiding  Musculoskeletal: no muscle tenderness, no joint swelling or tenderness  Extremities: no pedal edema - left hip dressing in place.  Neurological: no focal deficits    MEDICATIONS  (STANDING):  acetaminophen     Tablet .. 1000 milliGRAM(s) Oral every 8 hours  acetaminophen   IVPB .. 1000 milliGRAM(s) IV Intermittent every 8 hours  aspirin enteric coated 81 milliGRAM(s) Oral every 12 hours  calcium carbonate 1250 mG  + Vitamin D (OsCal 500 + D) 1 Tablet(s) Oral three times a day  cefadroxil 500 milliGRAM(s) Oral two times a day  celecoxib 200 milliGRAM(s) Oral every 12 hours  influenza  Vaccine (HIGH DOSE) 0.5 milliLiter(s) IntraMuscular once  lactated ringers. 1000 milliLiter(s) (100 mL/Hr) IV Continuous <Continuous>  multivitamin 1 Tablet(s) Oral daily  pantoprazole    Tablet 40 milliGRAM(s) Oral before breakfast  polyethylene glycol 3350 17 Gram(s) Oral at bedtime  senna 2 Tablet(s) Oral at bedtime  tranexamic acid IVPB 1000 milliGRAM(s) IV Intermittent once  tranexamic acid IVPB 1000 milliGRAM(s) IV Intermittent once    MEDICATIONS  (PRN):  HYDROmorphone  Injectable 0.5 milliGRAM(s) IV Push every 3 hours PRN Breakthrough pain  ondansetron Injectable 4 milliGRAM(s) IV Push every 6 hours PRN Nausea and/or Vomiting  oxyCODONE    IR 10 milliGRAM(s) Oral every 3 hours PRN Severe Pain (7 - 10)  oxyCODONE    IR 5 milliGRAM(s) Oral every 3 hours PRN Moderate Pain (4 - 6)    10-22    139  |  110[H]  |  17  ----------------------------<  123[H]  4.4   |  25  |  0.73    Ca    9.0      22 Oct 2024 08:28                          9.6    12.80 )-----------( 203      ( 22 Oct 2024 08:28 )             29.1

## 2024-10-22 NOTE — PROGRESS NOTE ADULT - ASSESSMENT
IMPRESSION:      67 yo woman s/p left hemiarthroplasty in 2022- now s/p revision to left CHARLES       *history of left hip fracture s/p left hemiarthroplasty in 2022- now s/p revision to left CHARLES   POD#1  EBL~250ml   monitor VS   pain regimen PRN  PT eval  Bowel regimen  IVF hydration   C/W prophylactic ABT - on cefradoxil extended antibiotic course   diet as tolerated   PPI  VTE prophylaxis  monitor CBC/BMP  monitor PO/supplemental O2 as needed  encourage IS      * VTE prophylaxis  Venodynes  INC mobility  CAPRINI score - 8  aspirin BID 81mg      Thank you for the consult, will follow.

## 2024-10-22 NOTE — DIETITIAN INITIAL EVALUATION ADULT - ORAL INTAKE PTA/DIET HISTORY
Pt lives with nephew. Denies difficulties cooking and grocery shopping. Typically has 3 meals/day. Consists of B: yogurt, L: sandwich and fruit or salad with multigrain crackers and D: protein, vegetable and starch. Endorses a good appetite PTA. Per diet recall, pt meeting </=75% of ENN.  Pt lives with nephew. Denies difficulties cooking and grocery shopping. Typically has 3 meals/day. Consists of: B- yogurt, L- sandwich and fruit or salad with multigrain crackers and D- protein, vegetable and starch. Endorses a good appetite PTA. Per diet recall, pt meeting </=75% of ENN.

## 2024-10-23 VITALS
SYSTOLIC BLOOD PRESSURE: 105 MMHG | DIASTOLIC BLOOD PRESSURE: 50 MMHG | TEMPERATURE: 98 F | HEART RATE: 60 BPM | RESPIRATION RATE: 20 BRPM | OXYGEN SATURATION: 98 %

## 2024-10-23 PROCEDURE — 99232 SBSQ HOSP IP/OBS MODERATE 35: CPT

## 2024-10-23 RX ORDER — SODIUM CHLORIDE 9 MG/ML
500 INJECTION, SOLUTION INTRAMUSCULAR; INTRAVENOUS; SUBCUTANEOUS ONCE
Refills: 0 | Status: COMPLETED | OUTPATIENT
Start: 2024-10-23 | End: 2024-10-23

## 2024-10-23 RX ADMIN — Medication 200 MILLIGRAM(S): at 11:20

## 2024-10-23 RX ADMIN — Medication 1000 MILLIGRAM(S): at 11:20

## 2024-10-23 RX ADMIN — Medication 1000 MILLIGRAM(S): at 05:11

## 2024-10-23 RX ADMIN — PANTOPRAZOLE SODIUM 40 MILLIGRAM(S): 40 TABLET, DELAYED RELEASE ORAL at 05:10

## 2024-10-23 RX ADMIN — Medication 1 TABLET(S): at 11:20

## 2024-10-23 RX ADMIN — Medication 1 TABLET(S): at 05:10

## 2024-10-23 RX ADMIN — Medication 100 MILLILITER(S): at 01:09

## 2024-10-23 RX ADMIN — CEFADROXIL 500 MILLIGRAM(S): 250 POWDER, FOR SUSPENSION ORAL at 11:19

## 2024-10-23 RX ADMIN — SODIUM CHLORIDE 500 MILLILITER(S): 9 INJECTION, SOLUTION INTRAMUSCULAR; INTRAVENOUS; SUBCUTANEOUS at 01:08

## 2024-10-23 RX ADMIN — Medication 81 MILLIGRAM(S): at 05:10

## 2024-10-23 NOTE — PROGRESS NOTE ADULT - SUBJECTIVE AND OBJECTIVE BOX
CHIEF COMPLAINT: left hip pain s/p Revision of left total hip arthroplasty    Chart and meds reviewed    10/23- Patient was seen and examined. VSS. No acute overnight events. Denies fever, pain or SOB. Tolerating diet. Worked with PT and did well. BP is improved. OOB to chair.     Vital Signs Last 24 Hrs  T(C): 36.3 (23 Oct 2024 08:18), Max: 36.9 (23 Oct 2024 00:08)  T(F): 97.4 (23 Oct 2024 08:18), Max: 98.4 (23 Oct 2024 00:08)  HR: 58 (23 Oct 2024 08:18) (58 - 74)  BP: 102/49 (23 Oct 2024 08:18) (94/56 - 108/50)  BP(mean): 57 (22 Oct 2024 16:02) (57 - 57)  RR: 16 (23 Oct 2024 08:18) (16 - 17)  SpO2: 94% (23 Oct 2024 08:18) (93% - 97%)    Parameters below as of 23 Oct 2024 08:18  Patient On (Oxygen Delivery Method): room air    ROS:   All 10 systems reviewed and found to be negative with the exception of what has been described above.     PE:  Constitutional: NAD, laying in bed  HEENT: NC/AT  Back: no tenderness  Respiratory: respirations even and non labored, LCTA  Cardiovascular: S1S2 regular, no murmurs  Abdomen: soft, not tender, not distended, positive BS  Genitourinary: voiding  Musculoskeletal: no muscle tenderness, no joint swelling or tenderness  Extremities: no pedal edema - left hip dressing in place.  Neurological: no focal deficits    MEDICATIONS  (STANDING):  acetaminophen     Tablet .. 1000 milliGRAM(s) Oral every 8 hours  aspirin enteric coated 81 milliGRAM(s) Oral every 12 hours  calcium carbonate 1250 mG  + Vitamin D (OsCal 500 + D) 1 Tablet(s) Oral three times a day  cefadroxil 500 milliGRAM(s) Oral two times a day  celecoxib 200 milliGRAM(s) Oral every 12 hours  influenza  Vaccine (HIGH DOSE) 0.5 milliLiter(s) IntraMuscular once  lactated ringers. 1000 milliLiter(s) (100 mL/Hr) IV Continuous <Continuous>  multivitamin 1 Tablet(s) Oral daily  pantoprazole    Tablet 40 milliGRAM(s) Oral before breakfast  polyethylene glycol 3350 17 Gram(s) Oral at bedtime  senna 2 Tablet(s) Oral at bedtime  tranexamic acid IVPB 1000 milliGRAM(s) IV Intermittent once  tranexamic acid IVPB 1000 milliGRAM(s) IV Intermittent once    MEDICATIONS  (PRN):  HYDROmorphone  Injectable 0.5 milliGRAM(s) IV Push every 3 hours PRN Breakthrough pain  ondansetron Injectable 4 milliGRAM(s) IV Push every 6 hours PRN Nausea and/or Vomiting  oxyCODONE    IR 10 milliGRAM(s) Oral every 3 hours PRN Severe Pain (7 - 10)  oxyCODONE    IR 5 milliGRAM(s) Oral every 3 hours PRN Moderate Pain (4 - 6)        10-22    139  |  110[H]  |  17  ----------------------------<  123[H]  4.4   |  25  |  0.73    Ca    9.0      22 Oct 2024 08:28                          9.6    12.80 )-----------( 203      ( 22 Oct 2024 08:28 )             29.1

## 2024-10-23 NOTE — PHARMACOTHERAPY INTERVENTION NOTE - COMMENTS
Patient tolerated cefazolin perioperatively and cefadroxil postop despite reported allergy to penicillin. Allergy list updated.

## 2024-10-23 NOTE — PROGRESS NOTE ADULT - SUBJECTIVE AND OBJECTIVE BOX
Pt seen at bedside this AM. No acute complaints, pain is well managed. Denies numbness, tingling, fevers, chills, CP, SOB, N/V/D.    Vital Signs (24 Hrs):  T(C): 36.9 (10-23-24 @ 00:08), Max: 36.9 (10-23-24 @ 00:08)  HR: 70 (10-23-24 @ 00:08) (64 - 74)  BP: 94/56 (10-23-24 @ 00:08) (94/56 - 108/50)  RR: 16 (10-23-24 @ 00:08) (16 - 17)  SpO2: 94% (10-23-24 @ 00:08) (94% - 97%)  Wt(kg): --    LABS:                          9.6    12.80 )-----------( 203      ( 22 Oct 2024 08:28 )             29.1     10-22    139  |  110[H]  |  17  ----------------------------<  123[H]  4.4   |  25  |  0.73    Ca    9.0      22 Oct 2024 08:28    Physical Exam:  General: NAD, pt laying in bed comfortably  SCDs in place BL    LLE:  Dressings C/D/I  Compartments soft, compressible  Calves nontender  Motor: +GSC, TA, EHL, FHL  SILT: SPN, DPN, Tib, Saph, Maged  +DP    A/P: 68F POD2 DAVID, conversion of left dago to total hip arthroplasty    -WBAT, posterior hip precautions  -Pain Control  -DVT ppx: SCDs, plan for A81   -Duracef protocol - 500mg Cefadroxil x7 days  -FU AM Labs  -Incentive Spirometry  -Medical management appreciated  -Dispo Home per PT eval  -D/w Dr. Figueroa, will update plan as needed

## 2024-10-23 NOTE — PROGRESS NOTE ADULT - ASSESSMENT
Work on diet and exercise. This helps with controlling your diabetes   Limit soft drinks - drink more WATER    Return in 1 month for repeat meningitis B vaccine         Well Care - Tips for Teens: Care Instructions  Your Care Instructions     Being a teen can be exciting and tough. You are finding your place in the world. And you may have a lot on your mind these days too--school, friends, sports, parents, and maybe even how you look. Some teens begin to feel the effects of stress, such as headaches, neck or back pain, or an upset stomach. To feel your best, it is important to start good health habits now. Follow-up care is a key part of your treatment and safety. Be sure to make and go to all appointments, and call your doctor if you are having problems. It's also a good idea to know your test results and keep a list of the medicines you take. How can you care for yourself at home? Staying healthy can help you cope with stress or depression. Here are some tips to keep you healthy. · Get at least 30 minutes of exercise on most days of the week. Walking is a good choice. You also may want to do other activities, such as running, swimming, cycling, or playing tennis or team sports. · Try cutting back on time spent on TV or video games each day. · Munch at least 5 helpings of fruits and veggies. A helping is a piece of fruit or ½ cup of vegetables. · Cut back to 1 can or small cup of soda or juice drink a day. Try water and milk instead. · Cheese, yogurt, milk--have at least 3 cups a day to get the calcium you need. · The decision to have sex is a serious one that only you can make. Not having sex is the best way to prevent HIV, STIs (sexually transmitted infections), and pregnancy. · If you do choose to have sex, condoms and birth control can increase your chances of protection against STIs and pregnancy. · Talk to an adult you feel comfortable with. Confide in this person and ask for his or her advice. IMPRESSION:      67 yo woman s/p left hemiarthroplasty in 2022- now s/p revision to left CHARLES       *history of left hip fracture s/p left hemiarthroplasty in 2022- now s/p revision to left CHARLES   POD#2  EBL~250ml   monitor VS   pain regimen PRN  PT eval  Bowel regimen  IVF hydration   C/W prophylactic ABT - on cefradoxil extended antibiotic course   diet as tolerated   PPI  VTE prophylaxis  monitor PO/supplemental O2 as needed  encourage IS  leukocytosis- likely reactive from surgery and in combination with steroid administration.  Afebrile.   Acute Blood Loss Anemia- related to surgery- no need for transfusion for now.  monitor CBC.       * VTE prophylaxis  Venodynes  INC mobility  CAPRINI score - 8  aspirin BID 81mg      Thank you for the consult, will follow. No medical contraindication for dc.  This can be a parent, a teacher, a , or someone else you trust.  Healthy ways to deal with stress   · Get 9 to 10 hours of sleep every night. · Eat healthy meals. · Go for a long walk. · Dance. Shoot hoops. Go for a bike ride. Get some exercise. · Talk with someone you trust.  · Laugh, cry, sing, or write in a journal.  When should you call for help? Call 911 anytime you think you may need emergency care. For example, call if:    · You feel life is meaningless or think about killing yourself. Talk to a counselor or doctor if any of the following problems lasts for 2 or more weeks.    · You feel sad a lot or cry all the time.     · You have trouble sleeping or sleep too much.     · You find it hard to concentrate, make decisions, or remember things.     · You change how you normally eat.     · You feel guilty for no reason. Where can you learn more? Go to [email or web address].[employer] [email or web address] and sign in to your [email or web address] account. Enter F405 in the [facility] box to learn more about \"Well Care - Tips for Teens: Care Instructions. \"     If you do not have an account, please click on the \"Sign Up Now\" link. Current as of: May 27, 2020               Content Version: 12.6  © 5821-5163 [employer]. Care instructions adapted under license by [facility] ([facility]). If you have questions about a medical condition or this instruction, always ask your healthcare professional. [place] [address] any warranty or liability for your use of this information. Well Visit, 12 years to [address] [place] [address] Teen: Care Instructions  Your Care Instructions  Your teen may be busy with school, sports, clubs, and friends. Your teen may need some help managing his or her time with activities, homework, and getting enough sleep and eating healthy foods. Most young teens tend to focus on themselves as they seek to gain independence.  They are learning more ways to solve problems and to think about things. While they are building confidence, they may feel insecure. Their peers may replace you as a source of support and advice. But they still value you and need you to be involved in their life. Follow-up care is a key part of your child's treatment and safety. Be sure to make and go to all appointments, and call your doctor if your child is having problems. It's also a good idea to know your child's test results and keep a list of the medicines your child takes. How can you care for your child at home? Eating and a healthy weight  · Encourage healthy eating habits. Your teen needs nutritious meals and healthy snacks each day. Stock up on fruits and vegetables. Offer healthy snacks, such as whole grain crackers or yogurt. · Help your child limit fast food. Also encourage your child to make healthier choices when eating out, such as choosing smaller meals or having a salad instead of fries. · Encourage your teen to drink water instead of soda or juice drinks. · Make meals a family time, and set a good example by making it an important time of the day for sharing. Healthy habits  · Encourage your teen to be active for at least one hour each day. Plan family activities, such as trips to the park, walks, bike rides, swimming, and gardening. · Limit TV, social media, and video games. Check for violence, bad language, and sex. Teach your child how to show respect and be safe when using social media. · Do not smoke or vape or allow others to smoke around your teen. If you need help quitting, talk to your doctor about stop-smoking programs and medicines. These can increase your chances of quitting for good. Be a good model so your teen will not want to try smoking or vaping. Safety  · Make your rules clear and consistent. Be fair and set a good example. · Show your teen that seat belts are important by wearing yours every time you drive. Make sure everyone ricardo up.   · Make sure your teen wears pads and a helmet that fits properly when riding a bike or scooter or when skateboarding or in-line skating. · It is safest not to have a gun in the house. If you do, keep it unloaded and locked up. Lock ammunition in a separate place. · Teach your teen that underage drinking can be harmful. It can lead to making poor choices. Tell your teen to call for a ride if there is any problem with drinking. Parenting  · Try to accept the natural changes in your teen and your relationship with your teen. · Know that your teen may not want to do as many family activities. · Respect your teen's privacy. Be clear about any safety concerns you have. · Have clear rules, but be flexible as your teen tries to be more independent. Set consequences for breaking the rules. · Listen when your teen wants to talk. This will build confidence that you care and will work with your teen to have a good relationship. Help your teen decide which activities are okay to do on their own, such as staying alone at home or going out with friends. · Spend some time with your teen doing what they like to do. This will help your communication and relationship. Talk about sexuality  · Start talking about sexuality early. This will make it less awkward each time. Be patient. Give yourselves time to get comfortable with each other. Start the conversations. Your teen may be interested but too embarrassed to ask. · Create an open environment. Let your teen know that you are always willing to talk. Listen carefully. This will reduce confusion and help you understand what is truly on your teen's mind. · Communicate your values and beliefs. Your teen can use your values to develop their own set of beliefs. · Talk about the pros and cons of not having sex, condom use, and birth control before your teen is sexually active. Talk to your teen about the chance of unplanned pregnancy.   · Talk to your teen about common STIs (sexually transmitted infections), such as chlamydia. This is a common STI that can cause infertility if it is not treated. Chlamydia screening is recommended yearly for all sexually active young women. School  Tell your teen why you think school is important. Show interest in your teen's school. Encourage your teen to join a school team or activity. If your teen is having trouble with classes, ask the school counselor to help find a . If your teen is having problems with friends, other students, or teachers, work with your teen and the school staff to find out what is wrong. Immunizations  Flu immunization is recommended once a year for all children ages 7 months and older. Talk to your doctor if your teen did not yet get the vaccines for human papillomavirus (HPV), meningococcal disease, and tetanus, diphtheria, and pertussis. When should you call for help? Watch closely for changes in your teen's health, and be sure to contact your doctor if:    · You are concerned that your teen is not growing or learning normally for his or her age.     · You are worried about your teen's behavior.     · You have other questions or concerns. Where can you learn more? Go to [email or web address] HospitalspeLumena [email or web address] org and sign in to your Ruth Kunstadter â€[employer] account. Enter A269 in the [employer] box to learn more about \"Well Visit, 12 years to [place] Teen: Care Instructions. \"     If you do not have an account, please click on the \"Sign Up Now\" link. Current as of: May 27, 2020               Content Version: 12.6  © 5888-8446 [employer]. Care instructions adapted under license by [facility] ([facility]). If you have questions about a medical condition or this instruction, always ask your healthcare professional. [place] [address] any warranty or liability for your use of this information.

## 2024-10-26 LAB
CULTURE RESULTS: NO GROWTH — SIGNIFICANT CHANGE UP
SPECIMEN SOURCE: SIGNIFICANT CHANGE UP

## 2024-10-31 ENCOUNTER — NON-APPOINTMENT (OUTPATIENT)
Age: 69
End: 2024-10-31
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
## 2024-10-31 DIAGNOSIS — Z79.899 OTHER LONG TERM (CURRENT) DRUG THERAPY: ICD-10-CM

## 2024-10-31 DIAGNOSIS — M16.12 UNILATERAL PRIMARY OSTEOARTHRITIS, LEFT HIP: ICD-10-CM

## 2024-10-31 DIAGNOSIS — T38.0X5A ADVERSE EFFECT OF GLUCOCORTICOIDS AND SYNTHETIC ANALOGUES, INITIAL ENCOUNTER: ICD-10-CM
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
## 2024-10-31 DIAGNOSIS — T84.84XA PAIN DUE TO INTERNAL ORTHOPEDIC PROSTHETIC DEVICES, IMPLANTS AND GRAFTS, INITIAL ENCOUNTER: ICD-10-CM

## 2024-10-31 DIAGNOSIS — D72.828 OTHER ELEVATED WHITE BLOOD CELL COUNT: ICD-10-CM

## 2024-10-31 DIAGNOSIS — Z96.642 PRESENCE OF LEFT ARTIFICIAL HIP JOINT: ICD-10-CM

## 2024-10-31 DIAGNOSIS — Z90.710 ACQUIRED ABSENCE OF BOTH CERVIX AND UTERUS: ICD-10-CM
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
## 2024-10-31 DIAGNOSIS — Z88.0 ALLERGY STATUS TO PENICILLIN: ICD-10-CM

## 2024-11-05 ENCOUNTER — APPOINTMENT (OUTPATIENT)
Dept: ORTHOPEDIC SURGERY | Facility: CLINIC | Age: 69
End: 2024-11-05

## 2024-11-05 DIAGNOSIS — Z96.642 PRESENCE OF LEFT ARTIFICIAL HIP JOINT: ICD-10-CM

## 2024-11-05 PROCEDURE — 73502 X-RAY EXAM HIP UNI 2-3 VIEWS: CPT | Mod: LT

## 2024-11-05 PROCEDURE — 99024 POSTOP FOLLOW-UP VISIT: CPT

## 2024-11-20 PROCEDURE — 73700 CT LOWER EXTREMITY W/O DYE: CPT

## 2024-11-20 PROCEDURE — 70551 MRI BRAIN STEM W/O DYE: CPT

## 2024-11-25 ENCOUNTER — NON-APPOINTMENT (OUTPATIENT)
Age: 69
End: 2024-11-25

## 2024-12-06 ENCOUNTER — APPOINTMENT (OUTPATIENT)
Dept: ORTHOPEDIC SURGERY | Facility: CLINIC | Age: 69
End: 2024-12-06
Payer: MEDICARE

## 2024-12-06 DIAGNOSIS — Z96.642 PRESENCE OF LEFT ARTIFICIAL HIP JOINT: ICD-10-CM

## 2024-12-06 PROCEDURE — 99024 POSTOP FOLLOW-UP VISIT: CPT

## 2024-12-06 PROCEDURE — 73502 X-RAY EXAM HIP UNI 2-3 VIEWS: CPT | Mod: LT

## 2024-12-26 ENCOUNTER — EMERGENCY (EMERGENCY)
Facility: HOSPITAL | Age: 69
LOS: 0 days | Discharge: HOME | End: 2024-12-26
Attending: EMERGENCY MEDICINE
Payer: MEDICARE

## 2024-12-26 VITALS
TEMPERATURE: 98 F | SYSTOLIC BLOOD PRESSURE: 115 MMHG | HEART RATE: 68 BPM | OXYGEN SATURATION: 100 % | DIASTOLIC BLOOD PRESSURE: 80 MMHG | RESPIRATION RATE: 19 BRPM

## 2024-12-26 VITALS — HEIGHT: 60 IN | WEIGHT: 160.06 LBS

## 2024-12-26 DIAGNOSIS — R51.9 HEADACHE, UNSPECIFIED: ICD-10-CM

## 2024-12-26 DIAGNOSIS — H92.01 OTALGIA, RIGHT EAR: ICD-10-CM

## 2024-12-26 DIAGNOSIS — J02.9 ACUTE PHARYNGITIS, UNSPECIFIED: ICD-10-CM

## 2024-12-26 DIAGNOSIS — Z98.890 OTHER SPECIFIED POSTPROCEDURAL STATES: Chronic | ICD-10-CM

## 2024-12-26 DIAGNOSIS — R50.9 FEVER, UNSPECIFIED: ICD-10-CM

## 2024-12-26 DIAGNOSIS — Z88.0 ALLERGY STATUS TO PENICILLIN: ICD-10-CM

## 2024-12-26 DIAGNOSIS — R05.1 ACUTE COUGH: ICD-10-CM

## 2024-12-26 DIAGNOSIS — Z96.642 PRESENCE OF LEFT ARTIFICIAL HIP JOINT: ICD-10-CM

## 2024-12-26 DIAGNOSIS — Z96.642 PRESENCE OF LEFT ARTIFICIAL HIP JOINT: Chronic | ICD-10-CM

## 2024-12-26 LAB
FLUAV AG NPH QL: DETECTED
FLUBV AG NPH QL: SIGNIFICANT CHANGE UP
RSV RNA NPH QL NAA+NON-PROBE: SIGNIFICANT CHANGE UP
SARS-COV-2 RNA SPEC QL NAA+PROBE: SIGNIFICANT CHANGE UP

## 2024-12-26 PROCEDURE — 71046 X-RAY EXAM CHEST 2 VIEWS: CPT

## 2024-12-26 PROCEDURE — 99284 EMERGENCY DEPT VISIT MOD MDM: CPT | Mod: FS

## 2024-12-26 PROCEDURE — 99283 EMERGENCY DEPT VISIT LOW MDM: CPT | Mod: 25

## 2024-12-26 PROCEDURE — 0241U: CPT

## 2024-12-26 PROCEDURE — 71046 X-RAY EXAM CHEST 2 VIEWS: CPT | Mod: 26

## 2024-12-26 RX ORDER — AZITHROMYCIN 250 MG/1
1 TABLET, FILM COATED ORAL
Qty: 4 | Refills: 0
Start: 2024-12-26 | End: 2024-12-29

## 2024-12-26 RX ORDER — AZITHROMYCIN 250 MG/1
500 TABLET, FILM COATED ORAL ONCE
Refills: 0 | Status: COMPLETED | OUTPATIENT
Start: 2024-12-26 | End: 2024-12-26

## 2024-12-26 RX ADMIN — AZITHROMYCIN 500 MILLIGRAM(S): 250 TABLET, FILM COATED ORAL at 13:31

## 2024-12-26 NOTE — ED ADULT TRIAGE NOTE - CHIEF COMPLAINT QUOTE
ambulatorily into ED with c/o sore throat, ear pain, cough, generalized body pain x2 days. no known sick contacts.

## 2024-12-26 NOTE — ED STATDOCS - NSFOLLOWUPINSTRUCTIONS_ED_ALL_ED_FT
Neumonía extrahospitalaria en los adultos  Community-Acquired Pneumonia, Adult  La neumonía es hugh infección pulmonar que causa inflamación y la acumulación de mucosidad y líquido en los pulmones. Avella puede causar tos y dificultad para respirar. La neumonía extrahospitalaria es aquella que se desarrolla en personas que no están, ni miller estado recientemente, en un hospital u otro centro de atención médica.    Por lo general, la neumonía se desarrolla marcy resultado de hugh enfermedad causada por un virus, marcy el resfrío común y la gripe (influenza). También puede ser causada por bacterias u hongos. Mientras que el resfrío y la gripe pueden transmitirse de hugh persona a otra (son contagiosos), la neumonía en sí no se considera contagiosa.    ¿Cuáles son las causas?  The human body, showing how a virus travels from the air to a person's lungs.   Esta afección puede ser causada por lo siguiente:  Virus.  Bacterias.  Hongos.  ¿Qué incrementa el riesgo?  Los siguientes factores pueden hacer que sea más propenso a desarrollar esta afección:  Tener más de 65 años o tener ciertas afecciones, por ejemplo:  Hugh enfermedad prolongada (crónica), marcy enfermedad pulmonar obstructiva crónica (EPOC), asma, insuficiencia cardíaca, diabetes o enfermedad renal.  Hugh afección que incrementa el riesgo de respirar (aspirar) mucosidad y otros líquidos por la boca o la nariz.  Sistema de defensa del organismo (sistema inmunitario) debilitado.  Que le hayan extirpado el bazo (esplenectomía). El bazo es el órgano que ayuda a combatir gérmenes e infecciones.  No limpiarse ciara los dientes y las encías (higiene dental deficiente).  Usar productos que contienen tabaco.  Viajar a lugares donde hay gérmenes que causan neumonía o estar cerca de ciertos animales o hábitats animales que podrían tener gérmenes que causan neumonía.  ¿Cuáles son los signos o síntomas?  Los síntomas de esta afección incluyen:  Tos seca o húmeda (productiva).  Fiebre, sudoración o escalofríos.  Dolor en el pecho; en especial, al respirar profundamente o toser.  Respiración rápida, dificultad para respirar o falta de aire.  Cansancio (fatiga) y vernell musculares.  ¿Cómo se diagnostica?  An outline of a person's body and an image of an X-ray of the person's chest.   Esta afección se puede diagnosticar mediante hammad antecedentes médicos y un examen físico. También pueden hacerle exámenes, que incluyen los siguientes:  Estudios de diagnóstico por imágenes, marcy hugh radiografía torácica o hugh ecografía pulmonar.  Pruebas de lo siguiente:  El nivel de oxígeno y otros gases en la new.  La mucosidad de los pulmones (esputo).  El líquido alrededor de los pulmones (líquido pleural).  La orina.  ¿Cómo se trata?  El tratamiento de esta afección depende de muchos factores; por ejemplo, la causa de la neumonía, los medicamentos que casper y otras afecciones que tenga.    En la mayoría de los adultos, la neumonía puede tratarse en casa. En algunos casos, el tratamiento debe realizarse en un hospital y puede incluir lo siguiente:  Medicamentos que se administran por boca (por vía oral) o a través de hugh vía intravenosa (IV), marcy los siguientes:  Antibióticos, si la neumonía fue causada por hugh bacteria.  Medicamentos que delmi a los virus (medicamentos antivirales), si un virus causó la neumonía.  Oxigenoterapia.  La neumonía grave, aunque es poco frecuente, puede requerir los siguientes tratamientos:  Ventilación mecánica. En trupti procedimiento, se utiliza hugh máquina para ayudarlo a respirar si no puede respirar ciara por hammad propios medios o mantener un nivel seguro de oxígeno en la new.  Toracocentesis. Trupti procedimiento elimina la acumulación de líquido pleural para ayudar a la respiración.  Siga estas instrucciones en nixon casa:  A comparison of three sample cups showing dark yellow, yellow, and pale yellow urine.  Medicamentos    Use los medicamentos de venta jim y los recetados solamente marcy se lo haya indicado el médico.  Use medicamentos para la tos solamente si tiene dificultad para dormir. Los medicamentos para la tos pueden impedir que el cuerpo elimine la mucosidad de los pulmones.  Si le recetaron antibióticos, tómelos marcy se lo haya indicado el médico. No deje de sarkis el antibiótico aunque comience a sentirse mejor.  Estilo de janell    A sign showing that a person should not drink alcohol.  A sign showing that a person should not smoke.  No heather alcohol.  No consuma ningún producto que contenga nicotina o tabaco. Estos productos incluyen cigarrillos, tabaco para mascar y aparatos de vapeo, marcy los cigarrillos electrónicos. Si necesita ayuda para dejar de fumar, consulte al médico.  Siga hugh dieta saludable. Esta debe incluir muchas verduras, frutas, cereales integrales, productos lácteos bajos en grasa y proteínas magras.  Instrucciones generales    Descanse mucho y duerma marcy mínimo 8 horas todas las noches.  De noche, duerma en posición parcialmente erguida. Coloque algunas almohadas debajo de la ruma o duerma en hugh silla reclinable.  Retome hammad actividades normales amrcy se lo haya indicado el médico. Pregúntele al médico qué actividades son seguras para usted.  Heather suficiente líquido marcy para mantener la orina de color amarillo pálido. Avella ayuda a diluir la mucosidad de los pulmones.  Si tiene dolor de garganta, georges gárgaras con hugh mezcla de agua y sal 3 o 4 veces al día, o cuando sea necesario. Para preparar agua con sal, disuelva totalmente de ½ a 1 cucharadita (de 3 a 6 g) de sal en 1 taza (237 ml) de agua tibia.  Concurra a todas las visitas de seguimiento.  ¿Cómo se previene?  Puede disminuir el riesgo de contraer neumonía extrahospitalaria con las siguientes medidas:  Vacunarse contra la neumonía. Hay diferentes tipos y esquemas de vacunas contra la neumonía. Pregúntele al médico cuál es la opción más adecuada para usted. Considere la posibilidad de aplicarse la vacuna contra la neumonía si usted:  Es mayor de 65 años.  Tiene entre 19 y 65 años y está en tratamiento para el cáncer, tiene hugh enfermedad pulmonar crónica o tiene otras afecciones que afectan el sistema inmunitario. Pregúntele al médico si esto se aplica en nixon ramon.  Colocarse la vacuna contra la gripe todos los años. Pregúntele al médico cuál es el tipo de vacuna más adecuado para usted.  Realizarse controles dentales regulares.  Lavarse las trina frecuentemente con agua y jabón kaitlin al menos 20 segundos. Use desinfectante para trina si no dispone de agua y jabón.  Comuníquese con un médico si:  Tiene fiebre.  Tiene dificultad para dormir porque no puede controlar la tos con medicamentos.  Solicite ayuda de inmediato si:  La falta de aire empeora.  El dolor torácico aumenta.  La enfermedad empeora, especialmente si usted es un adulto mayor o nixon sistema inmunitario es débil.  Tose y escupe new.  Estos síntomas pueden indicar hugh emergencia. Solicite ayuda de inmediato. Llame al 911.  No espere a lucie si los síntomas desaparecen.  No conduzca por hammad propios medios hasta el hospital.  Resumen  La neumonía es hugh infección en los pulmones.  La neumonía extrahospitalaria se desarrolla en personas que no miller estado en el hospital. Puede ser causada por bacterias, virus u hongos.  Esta afección puede tratarse con antibióticos o medicamentos antivirales.  La neumonía grave puede requerir hospitalización y tratamiento para ayudar a la respiración.

## 2024-12-26 NOTE — ED STATDOCS - WR ORDER NAME 1
Addendum  created 04/10/19 1443 by Ronnie Javier MD    Intraprocedure Event edited       Xray Chest 2 Views PA/Lat

## 2024-12-26 NOTE — ED STATDOCS - PATIENT PORTAL LINK FT
You can access the FollowMyHealth Patient Portal offered by [facility] by registering at the following website: [email or web address]. By joining [employer]’s FollowMyHealth portal, you will also be able to view your health information using other applications (apps) compatible with our system.

## 2024-12-26 NOTE — ED STATDOCS - ENMT, MLM
bilateral TMs unremarkable, no erythema to ear canal or TMs, no tonsillar enlargement or erythema, Nasal mucosa clear.  Mouth with normal mucosa  Throat has no vesicles, no oropharyngeal exudates and uvula is midline.

## 2024-12-26 NOTE — ED STATDOCS - NS_ATTENDINGSCRIBE_ED_ALL_ED
I personally performed the service described in the documentation recorded by the scribe in my presence, and it accurately and completely records my words and actions.
Patient is not pregnant (male or female)

## 2024-12-26 NOTE — ED ADULT NURSE NOTE - OBJECTIVE STATEMENT
Pt presents to the ED c/o sore throat, ear pain, cough, generalized body pain x2 days. no known sick contacts. Pt well appearing. Safety & comfort measures maintained. Denies any additional complaints at this time.

## 2024-12-26 NOTE — ED STATDOCS - DR. NAME
Beronica Nsaids Pregnancy And Lactation Text: These medications are considered safe up to 30 weeks gestation. It is excreted in breast milk.

## 2024-12-26 NOTE — ED STATDOCS - OBJECTIVE STATEMENT
used #[id number].  70 y/o female with no pertinent past medical history presents c/o sore throat, R ear pain, cough, and generalized body pain x3 days. Pt reports that her cough is dry but feels phlegm in throat, and when she coughs she feels chest and back pain. Endorses subjective fever, unknown temperature and chills. Has been taking Tylenol for pain, last dose last night. Pt reports that sister is sick at home with similar symptoms, and she was not tested for any illnesses, contrary to triage note. Past SHx hip surgery. Pt allergic to penicillin. No other complaints at this time.

## 2024-12-26 NOTE — ED STATDOCS - ATTENDING APP SHARED VISIT CONTRIBUTION OF CARE
I,Rosalino Loco MD,  performed the initial face to face bedside interview with this patient regarding history of present illness, review of symptoms and relevant past medical, social and family history.  I completed an independent physical examination.  I was the initial provider who evaluated this patient. I have signed out the follow up of any pending tests (i.e. labs, radiological studies) to the ACP.  I have communicated the patient’s plan of care and disposition with the ACP.  The history, relevant review of systems, past medical and surgical history, medical decision making, and physical examination was documented by the scribe in my presence and I attest to the accuracy of the documentation.

## 2024-12-26 NOTE — ED ADULT NURSE NOTE - CAS TRG GEN SKIN COLOR
Normal for race
Additional Notes: Patient consent was obtained to proceed with the visit and recommended plan of care after discussion of all risks and benefits, including the risks of COVID-19 exposure.
Detail Level: Simple

## 2024-12-26 NOTE — ED STATDOCS - PROGRESS NOTE DETAILS
Using  [id number], informed patient of her results.  Chest x-ray shows possible patchiness to the right lower lobe lung field.  Given patient's symptoms will start antibiotics and discharge home to have patient follow-up with her primary care doctor.  Patient is aware and agrees with plan. -Warren Forbes PA-C; Using , 69-year-old female with no significant past medical history presents with 3 days of sore throat, right ear pain, headache, dry cough.  Patient has been taking Tylenol because she has felt feverish and chills.  Last dose of Tylenol was last night.  Patient also states that her sister is also sick at home.  Vitals unremarkable, and patient is afebrile with a O2 saturation of 93% on room air.  Bilateral TMs unremarkable, pharynx nonerythematous without tonsillar enlargement, lungs clear to auscultation bilaterally.  Will check flu/COVID, chest x-ray to rule out pneumonia, reevaluate.

## 2025-02-07 ENCOUNTER — APPOINTMENT (OUTPATIENT)
Dept: ORTHOPEDIC SURGERY | Facility: CLINIC | Age: 70
End: 2025-02-07
Payer: MEDICARE

## 2025-02-07 DIAGNOSIS — Z96.642 PRESENCE OF LEFT ARTIFICIAL HIP JOINT: ICD-10-CM

## 2025-02-07 PROCEDURE — 73502 X-RAY EXAM HIP UNI 2-3 VIEWS: CPT | Mod: LT

## 2025-02-07 PROCEDURE — 99213 OFFICE O/P EST LOW 20 MIN: CPT

## 2025-02-07 PROCEDURE — G2211 COMPLEX E/M VISIT ADD ON: CPT

## 2025-02-18 DIAGNOSIS — Z96.642 PRESENCE OF LEFT ARTIFICIAL HIP JOINT: ICD-10-CM

## 2025-02-21 ENCOUNTER — NON-APPOINTMENT (OUTPATIENT)
Age: 70
End: 2025-02-21

## 2025-03-21 ENCOUNTER — NON-APPOINTMENT (OUTPATIENT)
Age: 70
End: 2025-03-21

## 2025-08-12 ENCOUNTER — APPOINTMENT (OUTPATIENT)
Dept: NEUROLOGY | Facility: CLINIC | Age: 70
End: 2025-08-12